# Patient Record
Sex: FEMALE | Race: WHITE | Employment: UNEMPLOYED | URBAN - METROPOLITAN AREA
[De-identification: names, ages, dates, MRNs, and addresses within clinical notes are randomized per-mention and may not be internally consistent; named-entity substitution may affect disease eponyms.]

---

## 2015-04-16 LAB — MAMMOGRAPHY, EXTERNAL: NORMAL

## 2019-07-06 ENCOUNTER — HOSPITAL ENCOUNTER (EMERGENCY)
Age: 49
Discharge: HOME | End: 2019-07-06
Attending: EMERGENCY MEDICINE
Payer: MEDICAID

## 2019-07-06 ENCOUNTER — APPOINTMENT (OUTPATIENT)
Dept: GENERAL RADIOLOGY | Age: 49
End: 2019-07-06
Payer: MEDICAID

## 2019-07-06 VITALS
SYSTOLIC BLOOD PRESSURE: 119 MMHG | TEMPERATURE: 97.4 F | BODY MASS INDEX: 29.07 KG/M2 | OXYGEN SATURATION: 93 % | WEIGHT: 180.12 LBS | RESPIRATION RATE: 18 BRPM | HEART RATE: 94 BPM | DIASTOLIC BLOOD PRESSURE: 84 MMHG

## 2019-07-06 DIAGNOSIS — S62.639A CLOSED FRACTURE OF TUFT OF DISTAL PHALANX OF FINGER: Primary | ICD-10-CM

## 2019-07-06 PROCEDURE — 73130 X-RAY EXAM OF HAND: CPT

## 2019-07-06 PROCEDURE — 4500000023 HC ED LEVEL 3 PROCEDURE

## 2019-07-06 PROCEDURE — 99283 EMERGENCY DEPT VISIT LOW MDM: CPT

## 2019-07-06 PROCEDURE — 6370000000 HC RX 637 (ALT 250 FOR IP): Performed by: EMERGENCY MEDICINE

## 2019-07-06 RX ORDER — HYDROCODONE BITARTRATE AND ACETAMINOPHEN 5; 325 MG/1; MG/1
1 TABLET ORAL ONCE
Status: COMPLETED | OUTPATIENT
Start: 2019-07-06 | End: 2019-07-06

## 2019-07-06 RX ORDER — SULFAMETHOXAZOLE AND TRIMETHOPRIM 800; 160 MG/1; MG/1
1 TABLET ORAL 2 TIMES DAILY
Qty: 14 TABLET | Refills: 0 | Status: SHIPPED | OUTPATIENT
Start: 2019-07-06 | End: 2019-07-13

## 2019-07-06 RX ORDER — HYDROCODONE BITARTRATE AND ACETAMINOPHEN 5; 325 MG/1; MG/1
1 TABLET ORAL EVERY 6 HOURS PRN
Qty: 10 TABLET | Refills: 0 | Status: SHIPPED | OUTPATIENT
Start: 2019-07-06 | End: 2019-07-13

## 2019-07-06 RX ADMIN — HYDROCODONE BITARTRATE AND ACETAMINOPHEN 1 TABLET: 5; 325 TABLET ORAL at 15:44

## 2019-07-06 ASSESSMENT — PAIN SCALES - GENERAL
PAINLEVEL_OUTOF10: 8

## 2019-07-06 ASSESSMENT — ENCOUNTER SYMPTOMS
APNEA: 0
SHORTNESS OF BREATH: 0
CHOKING: 0
STRIDOR: 0
EYE ITCHING: 0
EYE PAIN: 0
CHEST TIGHTNESS: 0
PHOTOPHOBIA: 0
WHEEZING: 0
COUGH: 0
ABDOMINAL DISTENTION: 0
EYE DISCHARGE: 0
EYE REDNESS: 0

## 2019-07-06 ASSESSMENT — PAIN DESCRIPTION - FREQUENCY: FREQUENCY: CONTINUOUS

## 2019-07-06 ASSESSMENT — PAIN DESCRIPTION - DESCRIPTORS: DESCRIPTORS: THROBBING

## 2019-07-06 ASSESSMENT — PAIN DESCRIPTION - PROGRESSION: CLINICAL_PROGRESSION: GRADUALLY WORSENING

## 2019-07-06 ASSESSMENT — PAIN DESCRIPTION - PAIN TYPE: TYPE: ACUTE PAIN

## 2019-07-06 ASSESSMENT — PAIN DESCRIPTION - LOCATION: LOCATION: FINGER (COMMENT WHICH ONE)

## 2019-07-06 ASSESSMENT — PAIN - FUNCTIONAL ASSESSMENT: PAIN_FUNCTIONAL_ASSESSMENT: PREVENTS OR INTERFERES SOME ACTIVE ACTIVITIES AND ADLS

## 2019-07-06 ASSESSMENT — PAIN DESCRIPTION - ONSET: ONSET: SUDDEN

## 2019-07-06 ASSESSMENT — PAIN DESCRIPTION - ORIENTATION: ORIENTATION: LEFT

## 2019-07-06 NOTE — ED PROVIDER NOTES
splint. I discussed with patient the results of evaluation in the ED, diagnosis, care, and prognosis. The plan is to discharge to home. Patient is in agreement with plan and questions have been answered.      I also discussed with patient the reasons which may require a return visit and the importance of follow-up care. The patient is well-appearing, nontoxic, and improved at the time of discharge. Patient agrees to call to arrange follow-up care as directed. Patient understands to return immediately for worsening/change in symptoms. CRITICAL CARE TIME   Total Critical Caretime was 18 minutes, excluding separately reportable procedures. There was a high probability of clinically significant/life threatening deterioration in the patient's condition which required my urgent intervention. PROCEDURES:  Ortho Injury  Date/Time: 7/6/2019 3:39 PM  Performed by: Elaine Briseno MD  Authorized by: Elaine Briseno MD   Consent: Verbal consent obtained. Risks and benefits: risks, benefits and alternatives were discussed  Consent given by: patient  Patient identity confirmed: verbally with patient  Injury location: finger  Location details: left little finger  Injury type: fracture  Pre-procedure neurovascular assessment: neurovascularly intact  Pre-procedure distal perfusion: normal  Pre-procedure neurological function: normal  Pre-procedure range of motion: normal  Manipulation performed: yes  Immobilization: splint  Post-procedure neurovascular assessment: post-procedure neurovascularly intact  Post-procedure distal perfusion: normal  Post-procedure neurological function: normal  Post-procedure range of motion: normal  Patient tolerance: Patient tolerated the procedure well with no immediate complications          FINAL IMPRESSION      1.  Closed fracture of tuft of distal phalanx of finger          DISPOSITION/PLAN   DISPOSITION Decision To Discharge 07/06/2019 03:30:55 PM    PATIENT REFERRED TO:  Rosamaria James

## 2022-12-07 ENCOUNTER — HOSPITAL ENCOUNTER (EMERGENCY)
Age: 52
Discharge: HOME | End: 2022-12-08
Attending: EMERGENCY MEDICINE
Payer: MEDICAID

## 2022-12-07 ENCOUNTER — APPOINTMENT (OUTPATIENT)
Dept: GENERAL RADIOLOGY | Age: 52
End: 2022-12-07
Payer: MEDICAID

## 2022-12-07 DIAGNOSIS — J44.1 COPD EXACERBATION (HCC): Primary | ICD-10-CM

## 2022-12-07 LAB
RAPID INFLUENZA  B AGN: NEGATIVE
RAPID INFLUENZA A AGN: NEGATIVE

## 2022-12-07 PROCEDURE — 99284 EMERGENCY DEPT VISIT MOD MDM: CPT

## 2022-12-07 PROCEDURE — 6370000000 HC RX 637 (ALT 250 FOR IP): Performed by: EMERGENCY MEDICINE

## 2022-12-07 PROCEDURE — 6360000002 HC RX W HCPCS: Performed by: EMERGENCY MEDICINE

## 2022-12-07 PROCEDURE — 71046 X-RAY EXAM CHEST 2 VIEWS: CPT

## 2022-12-07 PROCEDURE — 87804 INFLUENZA ASSAY W/OPTIC: CPT

## 2022-12-07 PROCEDURE — 87635 SARS-COV-2 COVID-19 AMP PRB: CPT

## 2022-12-07 RX ORDER — IPRATROPIUM BROMIDE AND ALBUTEROL SULFATE 2.5; .5 MG/3ML; MG/3ML
1 SOLUTION RESPIRATORY (INHALATION) ONCE
Status: COMPLETED | OUTPATIENT
Start: 2022-12-07 | End: 2022-12-07

## 2022-12-07 RX ORDER — DEXAMETHASONE 4 MG/1
8 TABLET ORAL ONCE
Status: COMPLETED | OUTPATIENT
Start: 2022-12-07 | End: 2022-12-07

## 2022-12-07 RX ORDER — ALBUTEROL SULFATE 1.25 MG/3ML
1 SOLUTION RESPIRATORY (INHALATION) EVERY 6 HOURS PRN
COMMUNITY
End: 2022-12-08 | Stop reason: SDUPTHER

## 2022-12-07 RX ADMIN — ALBUTEROL SULFATE 2.5 MG: 2.5 SOLUTION RESPIRATORY (INHALATION) at 23:49

## 2022-12-07 RX ADMIN — IPRATROPIUM BROMIDE AND ALBUTEROL SULFATE 1 AMPULE: .5; 3 SOLUTION RESPIRATORY (INHALATION) at 23:49

## 2022-12-07 RX ADMIN — DEXAMETHASONE 8 MG: 4 TABLET ORAL at 23:48

## 2022-12-07 ASSESSMENT — LIFESTYLE VARIABLES
HOW MANY STANDARD DRINKS CONTAINING ALCOHOL DO YOU HAVE ON A TYPICAL DAY: PATIENT DOES NOT DRINK
HOW OFTEN DO YOU HAVE A DRINK CONTAINING ALCOHOL: NEVER

## 2022-12-07 ASSESSMENT — PAIN - FUNCTIONAL ASSESSMENT: PAIN_FUNCTIONAL_ASSESSMENT: NONE - DENIES PAIN

## 2022-12-08 VITALS
SYSTOLIC BLOOD PRESSURE: 132 MMHG | OXYGEN SATURATION: 96 % | DIASTOLIC BLOOD PRESSURE: 88 MMHG | HEART RATE: 76 BPM | RESPIRATION RATE: 18 BRPM | WEIGHT: 170.64 LBS | TEMPERATURE: 98 F | BODY MASS INDEX: 27.42 KG/M2 | HEIGHT: 66 IN

## 2022-12-08 LAB — SARS-COV-2, NAAT: NOT DETECTED

## 2022-12-08 RX ORDER — METHYLPREDNISOLONE 4 MG/1
TABLET ORAL
Qty: 1 KIT | Refills: 0 | Status: SHIPPED | OUTPATIENT
Start: 2022-12-08 | End: 2022-12-14

## 2022-12-08 RX ORDER — ALBUTEROL SULFATE 1.25 MG/3ML
1 SOLUTION RESPIRATORY (INHALATION) EVERY 6 HOURS PRN
Qty: 360 ML | Refills: 0 | Status: SHIPPED | OUTPATIENT
Start: 2022-12-08

## 2022-12-08 ASSESSMENT — PAIN - FUNCTIONAL ASSESSMENT: PAIN_FUNCTIONAL_ASSESSMENT: NONE - DENIES PAIN

## 2022-12-08 NOTE — ED NOTES
Dr. Erin Oconnor in room to discuss radiology results and discharge plan of care with patient.       Corbin Mcnally RN  12/08/22 9939

## 2022-12-08 NOTE — ED NOTES
Discharge instructions reviewed with patient and verbalized understanding, denies further questions and successful teach back occurred. Offered wheelchair for discharge and declined. Discharged ambulatory with steady gait to ED lobby. Written discharge instructions provided to patient. Prescriptions x2 sent electronically to patient's pharmacy.       Benjamin Ibarra RN  12/08/22 1605

## 2022-12-08 NOTE — ED PROVIDER NOTES
[facility]  eMERGENCY dEPARTMENT eNCOUnter      Pt Name: Lloyd Topete  MRN: [id number]  [place] 1970  Date of evaluation: 12/7/2022  Provider: Nabor Ayala MD    CHIEF COMPLAINT       Chief Complaint   Patient presents with    Cough    Fever     History of COPD, states she has been sick for a week with cough, congestion and fever. Fever ended on Sunday, has been using nebulizer and sats have been 90-92%. CRITICAL CARE TIME   Total Critical Care time was 0 minutes, excluding separately reportable procedures. There was a high probability of clinically significant/life threatening deterioration in the patient's condition which required my urgent intervention. HISTORY OF PRESENT ILLNESS  (Location/Symptom, Timing/Onset, Context/Setting, Quality, Duration, Modifying Factors, Severity.)   Lloyd Topete is a 47 y.o. female who presents to the emergency department complaining of persistent cough and difficulty breathing. Patient has COPD. She continues to smoke. She states she been sick for a week. She states she had fever and cough. She had some runny nose and postnasal drip. She states the fever resolved on Monday. She states she still having some difficulty breathing. She has a nebulizer. She has been taking 1 or 2 breathing treatments today without relief. She has not had the flu or COVID-vaccine. Nursing Notes were reviewed and I agree. REVIEW OF SYSTEMS    (2-9 systems for level 4, 10 or more for level 5)     General: Fever that resolved on Monday. ENT: Rhinorrhea and. Postnasal drip. Respiratory: Nonproductive cough. Shortness of breath, wheezing. Cardiovascular: No chest pain. GI: No abdominal pain, vomiting, or diarrhea. Neuro: No headache or dizziness. Except as noted above the remainder of the review of systems was reviewed and negative.        PAST MEDICAL HISTORY     Past Medical History:   Diagnosis Date    Back pain     CAD (coronary artery disease)     COPD (chronic obstructive pulmonary disease) (HCC)     GERD (gastroesophageal reflux disease)     Hypertension     stress related    Neck pain     PTSD (post-traumatic stress disorder)          SURGICAL HISTORY       Past Surgical History:   Procedure Laterality Date    CHOLECYSTECTOMY      HEMORRHOID SURGERY  05/2015    HYSTERECTOMY (CERVIX STATUS UNKNOWN)      TUBAL LIGATION           CURRENT MEDICATIONS       Previous Medications    ASPIRIN 81 MG TABLET    Take 81 mg by mouth daily    ATORVASTATIN (LIPITOR) 20 MG TABLET    Begin after Lipid panel obtained. BUTALBITAL-APAP-CAFFEINE (FIORICET PO)    Take by mouth    CLONAZEPAM (KLONOPIN) 1 MG TABLET    Take 1 mg by mouth 2 times daily. GABAPENTIN (NEURONTIN) 300 MG CAPSULE    Take 300 mg by mouth nightly. IBUPROFEN (ADVIL;MOTRIN) 800 MG TABLET    Take 800 mg by mouth every 8 hours as needed for Pain. NAPROXEN PO    Take by mouth    ONDANSETRON (ZOFRAN ODT) 4 MG DISINTEGRATING TABLET    Take 1-2 tablets by mouth every 12 hours as needed for Nausea    OXYCODONE-ACETAMINOPHEN (PERCOCET) 7.5-325 MG PER TABLET    Take 1 tablet by mouth every 8 hours as needed for Pain    PANTOPRAZOLE (PROTONIX) 40 MG TABLET    Take 40 mg by mouth as needed    QUETIAPINE (SEROQUEL) 100 MG TABLET    Take 100 mg by mouth nightly. SERTRALINE (ZOLOFT) 100 MG TABLET    Take 150 mg by mouth daily.     SPIRONOLACTONE PO    Take by mouth    TIZANIDINE HCL (ZANAFLEX PO)    Take by mouth       ALLERGIES     Buspirone, Atenolol, Flexeril [cyclobenzaprine], Imdur [isosorbide dinitrate], Lisinopril, Paroxetine, Prednisone, Propranolol, Wellbutrin [bupropion], and Abilify [aripiprazole]    FAMILY HISTORY       Family History   Problem Relation Age of Onset    Heart Disease Mother     High Blood Pressure Mother     Heart Disease Father     High Blood Pressure Father     Diabetes Father           SOCIAL HISTORY       Social History     Socioeconomic History Marital status: Single     Spouse name: None    Number of children: 5    Years of education: 15    Highest education level: None   Tobacco Use    Smoking status: Every Day     Packs/day: 2.00     Years: 28.00     Pack years: 56.00     Types: Cigarettes    Smokeless tobacco: Never   Substance and Sexual Activity    Alcohol use: No    Drug use: No    Sexual activity: Never         PHYSICAL EXAM    (up to 7 for level 4, 8 or more for level 5)     ED Triage Vitals [12/07/22 2328]   BP Temp Temp Source Heart Rate Resp SpO2 Height Weight   134/89 97.7 °F (36.5 °C) Tympanic 75 20 95 % 5' 6\" (1.676 m) 170 lb 10.2 oz (77.4 kg)       General: Alert white female no acute distress. Head: Atraumatic and normocephalic. Eyes: No conjunctival injection. No discharge. Pupils equal round reactive. ENT: TMs normal.  Nose clear. Oropharynx is moist without erythema. Neck: Supple, nontender, no adenopathy. Heart: Regular rate and rhythm. No murmurs or gallops noted. Lungs: Breath sounds equal bilaterally with mild expiratory wheezing. No rales or rhonchi. Abdomen: Soft, nondistended, nontender. Musculoskeletal: No extremity edema. No calf tenderness. Skin: Warm and dry, good turgor. No pallor or cyanosis. No diaphoresis. DIFFERENTIAL DIAGNOSIS   Differential includes but is not limited to COPD exacerbation, COVID-19, influenza, pneumonia, pneumothorax, viral bronchitis, other. DIAGNOSTIC RESULTS     EKG: All EKG's are interpreted by Slade Haines MD in the absence of a cardiologist.      RADIOLOGY:   Non-plain film images such as CT, Ultrasound and MRI are read by the radiologist. Plain radiographic images are visualized and preliminarily interpreted Slade Haines MD with the below findings:      Interpretation per the Radiologist below, if available at the time of this note:    XR CHEST (2 VW)   Final Result   Radiographically clear lungs.                ED BEDSIDE ULTRASOUND:   Performed by ED Physician - none    LABS:  Labs Reviewed   COVID-19, RAPID   RAPID INFLUENZA A/B ANTIGENS       All other labs were within normal range or not returned as of this dictation. EMERGENCY DEPARTMENT COURSE and DIFFERENTIAL DIAGNOSIS/MDM:   Vitals:    Vitals:    12/07/22 2328   BP: 134/89   Pulse: 75   Resp: 20   Temp: 97.7 °F (36.5 °C)   TempSrc: Tympanic   SpO2: 95%   Weight: 170 lb 10.2 oz (77.4 kg)   Height: 5' 6\" (1.676 m)       This patient presents with symptoms as above. She had fever. She had cough. She is short of breath. Her fevers resolved. She has a history of COPD. She continues to smoke. She states she is still continuing to have trouble breathing and coughing. Her cough is nonproductive. She is afebrile here. She is not tachycardic. She is nonhypoxic. Her chest x-ray is clear. There is no evidence of pneumonia, pneumothorax, congestive heart failure. There is no pleural effusion. Her COVID screen is negative. Her influenza screen is negative. She was given hand-held nebulizers x2. She states prednisone makes her hyper. She has had Decadron before. I gave her a dose of Decadron p.o. here. She tolerated it fine. She was moving air better. I am going to discharge her on a Medrol Dosepak. She is going to try this to see if she tolerates it better than the prednisone. She is going to use her nebulizer every 4-6 hours. She will follow-up in 3 days if not improved. She will return for worsening of symptoms or new symptoms of concern. Test results, diagnosis, and treatment plan were discussed the patient. She understands treatment plan follow-up as discussed.         CONSULTS:  None    PROCEDURES:  None    FINAL IMPRESSION      1. COPD exacerbation [facility])          DISPOSITION/PLAN   DISPOSITION Decision To Discharge 12/08/2022 12:14:11 AM      PATIENT REFERRED TO:  AMMY Maguire CNP  [facility], [address] [place] [address],[address]  [address]    Schedule an appointment as soon as possible for a visit in 3 days  if not improved    DISCHARGE MEDICATIONS:  New Prescriptions    METHYLPREDNISOLONE (MEDROL, ALEXA,) 4 MG TABLET    Take by mouth.        (Please note that portions of this note were completed with a voice recognition program.  Efforts were made to edit the dictations but occasionally words are mis-transcribed.)    Sandra Lombardo MD  Attending Emergency Physician       Zheng Llamas MD  12/08/22 1660

## 2022-12-08 NOTE — DISCHARGE INSTRUCTIONS
Use your albuterol nebulizer every 4-6 hours for the next 2 days. Take methylprednisolone as prescribed until completed. You received a dose in the emergency department. You can start your prescription tomorrow when you pick it up. Return as needed for worsening of symptoms or new symptoms of concern.

## 2022-12-08 NOTE — ED TRIAGE NOTES
Patient ambulatory to Room 4 with c/o shortness of breath, cough, congestion and fever for a week. She reports the fever ended on Sunday, but other symptoms persist. States she had a covid positive patient right before she first became ill, but home covid test was negative. Reports history of COPD and has been using her home nebulizer. States her O2 sats have been 90-92% since she became ill. Patient also reports being out of most of her medications at home and has not scheduled a follow up with her PCP. She is awake, alert, oriented, resps easy & regular, speaks in complete sentences. Frequent cough noted. Patient is 2 P[employer] cigarette smoker. Skin w/d, MMM & pink, cap refill brisk.

## 2023-07-17 ENCOUNTER — NURSE TRIAGE (OUTPATIENT)
Dept: OTHER | Facility: CLINIC | Age: 53
End: 2023-07-17

## 2023-07-17 NOTE — TELEPHONE ENCOUNTER
Location of patient: [address] call from [facility]; Patient with [employer] Complaint requesting to establish care with [facility]. Subjective: Caller states \"episodes of chest tightness, lightheaded, weakness, small blockage, nerve pain on right eyebrow\"     Current Symptoms: see above, she says she is stressed. When she has a really stressful day, she can have chest tightness, stomach burning, aspirin usually works. If she does not take aspirin, she sweats and feels like she has to use bathroom. 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 She said she had a 30 percent blockage 10 years ago. Pain management was seen for the eyebrow pain and told she needed to see a PCP. Has been seen for the chest issue before. Has anxiety and panic attacks    Onset: 1 week ago for the eye pain;more frequent, chest issue has been ongoing for years    Associated Symptoms: NA    Pain Severity: 8/10 for the head pain when it happens, chest tightness is 10/10 when it happens; pressure, shock, stabbing for the headache;for the chest, squeezing tightness, stomach burning; intermittent, waxing and waning. Last episode of the chest tightness was a week ago, this happens once a week. Temperature: n/a     What has been tried: aspirin    LMP: NA Pregnant: NA    Recommended disposition: See in Office Today advised ED/UCC if unable to obtain an appointment within time frame    Care advice provided, patient verbalizes understanding; denies any other questions or concerns; instructed to call back for any new or worsening symptoms. Patient/Caller agrees with recommended disposition; writer provided warm transfer to [facility] [place] for appointment scheduling    Attention Provider: Thank you for allowing me to participate in the care of your patient. The patient was connected to triage in response to information provided to the [facility].   Please do not respond through this encounter as the response is not

## 2023-07-24 ENCOUNTER — OFFICE VISIT (OUTPATIENT)
Dept: INTERNAL MEDICINE CLINIC | Age: 53
End: 2023-07-24
Payer: MEDICAID

## 2023-07-24 VITALS
SYSTOLIC BLOOD PRESSURE: 118 MMHG | BODY MASS INDEX: 27.73 KG/M2 | WEIGHT: 171.8 LBS | DIASTOLIC BLOOD PRESSURE: 76 MMHG | TEMPERATURE: 98.7 F | OXYGEN SATURATION: 93 % | HEART RATE: 89 BPM

## 2023-07-24 DIAGNOSIS — I10 ESSENTIAL HYPERTENSION: ICD-10-CM

## 2023-07-24 DIAGNOSIS — E55.9 VITAMIN D DEFICIENCY: ICD-10-CM

## 2023-07-24 DIAGNOSIS — Z11.4 SCREENING FOR HIV WITHOUT PRESENCE OF RISK FACTORS: ICD-10-CM

## 2023-07-24 DIAGNOSIS — E87.6 HYPOKALEMIA: ICD-10-CM

## 2023-07-24 DIAGNOSIS — E83.42 HYPOMAGNESEMIA: ICD-10-CM

## 2023-07-24 DIAGNOSIS — Z12.31 ENCOUNTER FOR SCREENING MAMMOGRAM FOR MALIGNANT NEOPLASM OF BREAST: ICD-10-CM

## 2023-07-24 DIAGNOSIS — R73.09 INCREASED BLOOD GLUCOSE: ICD-10-CM

## 2023-07-24 DIAGNOSIS — F41.9 ANXIETY: ICD-10-CM

## 2023-07-24 DIAGNOSIS — Z11.59 NEED FOR HEPATITIS C SCREENING TEST: ICD-10-CM

## 2023-07-24 DIAGNOSIS — I25.119 CORONARY ARTERY DISEASE INVOLVING NATIVE CORONARY ARTERY OF NATIVE HEART WITH ANGINA PECTORIS (HCC): Primary | ICD-10-CM

## 2023-07-24 LAB
25(OH)D3 SERPL-MCNC: 40.5 NG/ML
ALBUMIN SERPL-MCNC: 4.4 G/DL (ref 3.4–5)
ALBUMIN/GLOB SERPL: 1.9 {RATIO} (ref 1.1–2.2)
ALP SERPL-CCNC: 102 U/L (ref 40–129)
ALT SERPL-CCNC: 17 U/L (ref 10–40)
ANION GAP SERPL CALCULATED.3IONS-SCNC: 13 MMOL/L (ref 3–16)
AST SERPL-CCNC: 13 U/L (ref 15–37)
BILIRUB SERPL-MCNC: <0.2 MG/DL (ref 0–1)
BUN SERPL-MCNC: 15 MG/DL (ref 7–20)
CALCIUM SERPL-MCNC: 9.6 MG/DL (ref 8.3–10.6)
CHLORIDE SERPL-SCNC: 103 MMOL/L (ref 99–110)
CHOLEST SERPL-MCNC: 152 MG/DL (ref 0–199)
CO2 SERPL-SCNC: 23 MMOL/L (ref 21–32)
CREAT SERPL-MCNC: 0.7 MG/DL (ref 0.6–1.1)
GFR SERPLBLD CREATININE-BSD FMLA CKD-EPI: >60 ML/MIN/{1.73_M2}
GLUCOSE SERPL-MCNC: 108 MG/DL (ref 70–99)
HCV AB SERPL QL IA: NORMAL
HDLC SERPL-MCNC: 38 MG/DL (ref 40–60)
LDLC SERPL CALC-MCNC: 87 MG/DL
MAGNESIUM SERPL-MCNC: 1.8 MG/DL (ref 1.8–2.4)
POTASSIUM SERPL-SCNC: 4.4 MMOL/L (ref 3.5–5.1)
PROT SERPL-MCNC: 6.7 G/DL (ref 6.4–8.2)
SODIUM SERPL-SCNC: 139 MMOL/L (ref 136–145)
TRIGL SERPL-MCNC: 136 MG/DL (ref 0–150)
TSH SERPL DL<=0.005 MIU/L-ACNC: 0.94 UIU/ML (ref 0.27–4.2)
VLDLC SERPL CALC-MCNC: 27 MG/DL

## 2023-07-24 PROCEDURE — 3074F SYST BP LT 130 MM HG: CPT | Performed by: INTERNAL MEDICINE

## 2023-07-24 PROCEDURE — G8419 CALC BMI OUT NRM PARAM NOF/U: HCPCS | Performed by: INTERNAL MEDICINE

## 2023-07-24 PROCEDURE — G8427 DOCREV CUR MEDS BY ELIG CLIN: HCPCS | Performed by: INTERNAL MEDICINE

## 2023-07-24 PROCEDURE — 3017F COLORECTAL CA SCREEN DOC REV: CPT | Performed by: INTERNAL MEDICINE

## 2023-07-24 PROCEDURE — 99204 OFFICE O/P NEW MOD 45 MIN: CPT | Performed by: INTERNAL MEDICINE

## 2023-07-24 PROCEDURE — 3078F DIAST BP <80 MM HG: CPT | Performed by: INTERNAL MEDICINE

## 2023-07-24 PROCEDURE — 4004F PT TOBACCO SCREEN RCVD TLK: CPT | Performed by: INTERNAL MEDICINE

## 2023-07-24 PROCEDURE — 36415 COLL VENOUS BLD VENIPUNCTURE: CPT | Performed by: INTERNAL MEDICINE

## 2023-07-24 RX ORDER — ATORVASTATIN CALCIUM 40 MG/1
40 TABLET, FILM COATED ORAL DAILY
Qty: 90 TABLET | Refills: 1 | Status: SHIPPED | OUTPATIENT
Start: 2023-07-24

## 2023-07-24 RX ORDER — ASPIRIN 81 MG/1
81 TABLET ORAL DAILY
Qty: 90 TABLET | Refills: 1 | Status: SHIPPED | OUTPATIENT
Start: 2023-07-24

## 2023-07-24 SDOH — ECONOMIC STABILITY: FOOD INSECURITY: WITHIN THE PAST 12 MONTHS, YOU WORRIED THAT YOUR FOOD WOULD RUN OUT BEFORE YOU GOT MONEY TO BUY MORE.: NEVER TRUE

## 2023-07-24 SDOH — ECONOMIC STABILITY: HOUSING INSECURITY
IN THE LAST 12 MONTHS, WAS THERE A TIME WHEN YOU DID NOT HAVE A STEADY PLACE TO SLEEP OR SLEPT IN A SHELTER (INCLUDING NOW)?: NO

## 2023-07-24 SDOH — ECONOMIC STABILITY: FOOD INSECURITY: WITHIN THE PAST 12 MONTHS, THE FOOD YOU BOUGHT JUST DIDN'T LAST AND YOU DIDN'T HAVE MONEY TO GET MORE.: NEVER TRUE

## 2023-07-24 SDOH — ECONOMIC STABILITY: INCOME INSECURITY: HOW HARD IS IT FOR YOU TO PAY FOR THE VERY BASICS LIKE FOOD, HOUSING, MEDICAL CARE, AND HEATING?: NOT HARD AT ALL

## 2023-07-24 ASSESSMENT — PATIENT HEALTH QUESTIONNAIRE - PHQ9
1. LITTLE INTEREST OR PLEASURE IN DOING THINGS: 0
SUM OF ALL RESPONSES TO PHQ QUESTIONS 1-9: 0
2. FEELING DOWN, DEPRESSED OR HOPELESS: 0
SUM OF ALL RESPONSES TO PHQ9 QUESTIONS 1 & 2: 0
SUM OF ALL RESPONSES TO PHQ QUESTIONS 1-9: 0

## 2023-07-24 ASSESSMENT — ENCOUNTER SYMPTOMS
SHORTNESS OF BREATH: 0
COUGH: 0
VOMITING: 0
NAUSEA: 0
SORE THROAT: 0
ABDOMINAL PAIN: 0
CHEST TIGHTNESS: 1
BLOOD IN STOOL: 0

## 2023-07-24 NOTE — PROGRESS NOTES
Maame Nelson (:  1970) is a 47 y.o. female, New patient, here for evaluation of the following chief complaint(s):  Establish Care, Chest Pain, and Eye Pain (Right eye)         ASSESSMENT/PLAN:  1. Coronary artery disease involving native coronary artery of native heart with angina pectoris (HCC)  Chronic, now uncontrolled. Patient states that she gets chest pain whenever she is stressed. Patient is taking atorvastatin 20 mg daily and aspirin 81 mg daily. Patient is not willing to start metoprolol since she had issues with blood pressure medications in the past.  Patient is not having chest pain currently. Increasing the dose of atorvastatin to 40 mg daily and continue current dose of aspirin  Advised patient to follow-up with cardiology as soon as possible for further evaluation treatment, referral placed. -     Negrita Murray MD, Cardiology, [facility]  -     aspirin 81 MG EC tablet; Take 1 tablet by mouth daily, Disp-90 tablet, R-1Normal  -     atorvastatin (LIPITOR) 40 MG tablet; Take 1 tablet by mouth daily, Disp-90 tablet, R-1Normal  -     Lipid Panel  2. Essential hypertension  - Stable   - Continue current dose of spironolactone. Patient is not willing to start metoprolol since she had issues with blood pressure medications in the past.  She would like to stay only on Aldactone for blood pressure control.  -     Comprehensive Metabolic Panel  -     TSH with Reflex  3. Hypokalemia  Chronic problem. Continue current dose of Aldactone. Check potassium level today. 4. Hypomagnesemia  Chronic problem. Check magnesium level today. Will treat based on results. 5. Increased blood glucose  -     Hemoglobin A1C  6. Screening for HIV without presence of risk factors  -     HIV Screen  7. Need for hepatitis C screening test  -     Hepatitis C Antibody  8. Anxiety  Chronic, uncontrolled.   Patient has tried multiple medications in the past including sertraline, benzodiazepines with no

## 2023-07-25 LAB
EST. AVERAGE GLUCOSE BLD GHB EST-MCNC: 108.3 MG/DL
HBA1C MFR BLD: 5.4 %
HIV 1+2 AB+HIV1 P24 AG SERPL QL IA: NORMAL
HIV 2 AB SERPL QL IA: NORMAL
HIV1 AB SERPL QL IA: NORMAL
HIV1 P24 AG SERPL QL IA: NORMAL

## 2023-09-05 ENCOUNTER — COMMUNITY OUTREACH (OUTPATIENT)
Dept: INTERNAL MEDICINE CLINIC | Age: 53
End: 2023-09-05

## 2023-09-05 NOTE — PROGRESS NOTES
Patient's HM shows they are overdue for Mammogram  CareEverywhere and  files searched without success.

## 2023-09-14 ENCOUNTER — OFFICE VISIT (OUTPATIENT)
Dept: PSYCHIATRY | Age: 53
End: 2023-09-14

## 2023-09-14 DIAGNOSIS — G47.01 INSOMNIA DUE TO MEDICAL CONDITION: Primary | ICD-10-CM

## 2023-09-14 DIAGNOSIS — F33.0 MILD EPISODE OF RECURRENT MAJOR DEPRESSIVE DISORDER (HCC): ICD-10-CM

## 2023-09-14 DIAGNOSIS — F41.1 GAD (GENERALIZED ANXIETY DISORDER): ICD-10-CM

## 2023-09-14 PROCEDURE — 99205 OFFICE O/P NEW HI 60 MIN: CPT | Performed by: REGISTERED NURSE

## 2023-09-14 RX ORDER — QUETIAPINE FUMARATE 25 MG/1
25 TABLET, FILM COATED ORAL DAILY
Qty: 30 TABLET | Refills: 2 | Status: SHIPPED | OUTPATIENT
Start: 2023-09-14 | End: 2023-10-14

## 2023-09-14 RX ORDER — CLONAZEPAM 0.5 MG/1
0.5 TABLET ORAL DAILY PRN
Qty: 30 TABLET | Refills: 0 | Status: SHIPPED | OUTPATIENT
Start: 2023-09-14 | End: 2023-10-14

## 2023-09-14 ASSESSMENT — PATIENT HEALTH QUESTIONNAIRE - PHQ9
8. MOVING OR SPEAKING SO SLOWLY THAT OTHER PEOPLE COULD HAVE NOTICED. OR THE OPPOSITE, BEING SO FIGETY OR RESTLESS THAT YOU HAVE BEEN MOVING AROUND A LOT MORE THAN USUAL: 0
6. FEELING BAD ABOUT YOURSELF - OR THAT YOU ARE A FAILURE OR HAVE LET YOURSELF OR YOUR FAMILY DOWN: 0
SUM OF ALL RESPONSES TO PHQ QUESTIONS 1-9: 9
3. TROUBLE FALLING OR STAYING ASLEEP: 3
SUM OF ALL RESPONSES TO PHQ9 QUESTIONS 1 & 2: 1
SUM OF ALL RESPONSES TO PHQ QUESTIONS 1-9: 9
2. FEELING DOWN, DEPRESSED OR HOPELESS: 0
5. POOR APPETITE OR OVEREATING: 1
9. THOUGHTS THAT YOU WOULD BE BETTER OFF DEAD, OR OF HURTING YOURSELF: 0
4. FEELING TIRED OR HAVING LITTLE ENERGY: 3
SUM OF ALL RESPONSES TO PHQ QUESTIONS 1-9: 9
SUM OF ALL RESPONSES TO PHQ QUESTIONS 1-9: 9
1. LITTLE INTEREST OR PLEASURE IN DOING THINGS: 1
7. TROUBLE CONCENTRATING ON THINGS, SUCH AS READING THE NEWSPAPER OR WATCHING TELEVISION: 1

## 2023-09-14 ASSESSMENT — ANXIETY QUESTIONNAIRES
1. FEELING NERVOUS, ANXIOUS, OR ON EDGE: 3
3. WORRYING TOO MUCH ABOUT DIFFERENT THINGS: 3
2. NOT BEING ABLE TO STOP OR CONTROL WORRYING: 3
6. BECOMING EASILY ANNOYED OR IRRITABLE: 3
7. FEELING AFRAID AS IF SOMETHING AWFUL MIGHT HAPPEN: 0
GAD7 TOTAL SCORE: 18
4. TROUBLE RELAXING: 3
5. BEING SO RESTLESS THAT IT IS HARD TO SIT STILL: 3

## 2023-09-14 NOTE — PROGRESS NOTES
There is no acute intracranial hemorrhage, mass effect or  midline shift. No abnormal extra-axial fluid collection. The gray-white  differentiation is maintained without evidence of an acute infarct. There is  no evidence of hydrocephalus. ORBITS: The visualized portion of the orbits demonstrate no acute abnormality. SINUSES: The visualized paranasal sinuses and mastoid air cells demonstrate  no acute abnormality. SOFT TISSUES/SKULL:  No acute abnormality of the visualized skull or soft  tissues. Impression  No acute intracranial abnormality. MRI Brain: No results found for this or any previous visit. Mental Status Examination  Appearance    alert, cooperative  Motor: Normal strength and tone, No abnormal movements, tics or mannerisms. Speech    spontaneous, normal rate, normal volume, and well articulated  Mood/Affect    Anxious  Irritability / anxiety  Thought Process    linear, goal directed, and coherent  Thought Content    intact , no suicidal ideation  Associations    logical connections  Attention/Concentration    impaired  Memory    recent and remote memory intact  Insight/Judgement    Fair / Intact    Safety plan  Discussed and educated patient to call [phone], or 911, or go to nearest emergency room if patient experiences SI/HI immediately. In addition, Patient educated to use the National Suicide Hotlines: 3-047-295-TALK (6-394.881.5967) and 1-564-EHFAXEP (4-250.828.3641) and Local psychiatric Emergency Services given to patient during the office visit. Total time spent on this encounter: 64 min    Thank you for consult. Please do not hesitate to contact provider if there are additional questions regarding patient.     Anupama Poole DNP, PMHNP-BC, CNP  9/14/2023

## 2023-09-14 NOTE — PATIENT INSTRUCTIONS
Here are some of Psychiatric Emergency resources for you:     National suicide hotlines: 97 245722, 7-189-337-TALK (5-847.309.1786) and 9-931-KWOABJO (0-922.598.9154). 2.  Call 911 or go to any nearest emergency room   3. Access the [facility] Emergency Psychiatry Services:     - Go to the [facility] Psychiatric Emergency Services (PES) at [address] [facility], [address]   - Call the [address] at [phone].    - Call the [facility] Mobile Crisis Team at [phone]     Anti-depressant drugs: This class of drugs can cause sedation, blurred vision, dry-mouth, constipation, postural hypotension, urinary retention, tachycardia, muscle tremors, agitation, headache, skin rash, photo sensitivity, excessive weight gain, glaucoma, heart disease, lowering seizure threshold, increase risk of suicidal thinking or ideations, excessive sweating, sextual dysfunction, insomnia, anxiety, bruxism, GI bleeding, pregnancy complications and birth defects, possible death, etc.      Anti-anxiety drugs: Sedation, morning hangover, ataxia, nausea, respiratory depression, decrease cognitive function, light-headiness, withdrawal effects, anterograde amnesia, possible death, etc.              Insomnia : General Principles     You should sleep in only one location which is your bedroom. Do not watch TV, use computer or read in your bedroom. You want to associate your bedroom with sleep only.  You should do these things in a different room     Your sleep environment should be dark with no light exposure     When you wake in the morning you should exposure yourself to bright light     Get into bed at the same time every night     Get up out of bed no later than 8 AM     No naps during daytime     Do not watch the clock     No caffeine after 2 PM      Try to exercise in the late afternoon or early evening as many days a week as possible

## 2023-09-20 ENCOUNTER — OFFICE VISIT (OUTPATIENT)
Dept: PSYCHOLOGY | Age: 53
End: 2023-09-20

## 2023-09-20 DIAGNOSIS — F41.1 GAD (GENERALIZED ANXIETY DISORDER): ICD-10-CM

## 2023-09-20 DIAGNOSIS — Z86.59 HISTORY OF POSTTRAUMATIC STRESS DISORDER (PTSD): ICD-10-CM

## 2023-09-20 DIAGNOSIS — F33.1 MAJOR DEPRESSIVE DISORDER, RECURRENT EPISODE, MODERATE (HCC): Primary | ICD-10-CM

## 2023-09-20 NOTE — PROGRESS NOTES
Behavioral Health Consultation  Christy Crump, Ph.D.  Psychologist  9/20/2023  11:30 AM EDT      Time spent with Patient: 30 minutes  [facility]. Reason for Consult: 30  Referring Provider: Caleb Bloom, [id number] [address]    Feedback for PCP:     Pt provided informed consent for the behavioral health program. Discussed with patient model of service to include the limits of confidentiality (i.e. abuse reporting, suicide intervention, etc.) and short-term intervention focused approach. Pt indicated understanding. Feedback given to PCP. S:  The patient reports that since her visit with her psych NP and the scripts she was given, she is experiencing less anxiety. She doesn't report constant worry, tenseness and irritability, or disrupted sleep. During this visit she did not seem to be experiencing detectable anxiety symptoms and talked about what she has been able to do that she previously might not have been able to due to anxiety. She says that she is being more assertive and not letting small upsets capture her. She says that she is less irritable and \"I'm coming out of my room more. \" She says. \"Taking my chill pill helps\" by which she means her clonazepam. She said that she is sleeping better with the quetiapine and that she is somewhat groggy in the morning but she isn't complaining about it. She describes a rather chaotic home/personal life with assorted family member living together in a 2 bedroom house. She described her relationship with her 1year-old granddaughter that is very sofia to her mostly because her house has been the only house she has known. She said that she has 5 children between the ages of 16 and 29. 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 She says that she is so strong that she can carry \"a 170 pound man using my biceps. \" She said at the [employer] she shares a room with the 3 other

## 2023-10-16 ENCOUNTER — TELEMEDICINE (OUTPATIENT)
Dept: PSYCHIATRY | Age: 53
End: 2023-10-16
Payer: MEDICAID

## 2023-10-16 DIAGNOSIS — F33.0 MILD EPISODE OF RECURRENT MAJOR DEPRESSIVE DISORDER (HCC): Primary | ICD-10-CM

## 2023-10-16 DIAGNOSIS — G47.01 INSOMNIA DUE TO MEDICAL CONDITION: ICD-10-CM

## 2023-10-16 DIAGNOSIS — F41.1 GAD (GENERALIZED ANXIETY DISORDER): ICD-10-CM

## 2023-10-16 PROCEDURE — 3017F COLORECTAL CA SCREEN DOC REV: CPT | Performed by: REGISTERED NURSE

## 2023-10-16 PROCEDURE — G8428 CUR MEDS NOT DOCUMENT: HCPCS | Performed by: REGISTERED NURSE

## 2023-10-16 PROCEDURE — 4004F PT TOBACCO SCREEN RCVD TLK: CPT | Performed by: REGISTERED NURSE

## 2023-10-16 PROCEDURE — G8419 CALC BMI OUT NRM PARAM NOF/U: HCPCS | Performed by: REGISTERED NURSE

## 2023-10-16 PROCEDURE — 99213 OFFICE O/P EST LOW 20 MIN: CPT | Performed by: REGISTERED NURSE

## 2023-10-16 PROCEDURE — G8484 FLU IMMUNIZE NO ADMIN: HCPCS | Performed by: REGISTERED NURSE

## 2023-10-16 RX ORDER — QUETIAPINE FUMARATE 25 MG/1
25 TABLET, FILM COATED ORAL DAILY
Qty: 90 TABLET | Refills: 0 | Status: SHIPPED | OUTPATIENT
Start: 2023-10-16 | End: 2024-01-14

## 2023-10-16 RX ORDER — CLONAZEPAM 0.5 MG/1
0.5 TABLET ORAL DAILY PRN
Qty: 30 TABLET | Refills: 0 | Status: SHIPPED | OUTPATIENT
Start: 2023-10-16 | End: 2023-11-15

## 2023-10-16 NOTE — PATIENT INSTRUCTIONS
Here are some of Psychiatric Emergency resources for you:     National suicide hotlines: 97 599728, 6-537-503-TALK (5-579.919.1490) and 6-843-RAEIRIQ (9-758.499.5098). 2.  Call 911 or go to any nearest emergency room   3.    Access the [facility] Emergency Psychiatry Services:     - Go to the [facility] Psychiatric Emergency Services (PES) at [address] [facility], [address]   - Call the [address] at [phone].    - Call the [facility] Mobile Crisis Team at [phone]

## 2023-10-16 NOTE — PROGRESS NOTES
Barak Antunez (:  1970) is a 47 y.o. female,Established patient, here for evaluation of the following chief complaint(s): No chief complaint on file. Diagnosis:  1. Mild episode of recurrent major depressive disorder ([address])  2. ERIN (generalized anxiety disorder)  3. Insomnia due to medical condition    ASSESSMENT/PLAN:    Psychiatric   -  Continue Seroquel 25 mg/nightly. Can increase Seroquel  to  mg/nightly  - Continue Klonopin 0.5 mg daily PRN. Reports \" it has been helping a lot. \"   - Needs UDS at next visit.   -Patient also advised not mix OTC medications with klonopin and Seroquel. - Medication R/B/SE discussed and patient gave verbal consent for tx.  -Practice complementary health approaches such as: self-management strategies, relaxation techniques, yoga, and physical exercise as tolerated. 2. Safety  -NO Imminent risk of danger to self/others based on today's assessment. Patient is appropriate for outpatient level of care. Safety plan includes: 988, 911, PES, hotlines, and interventions discussed today. 3. Psychotherapy  - continue with Dr Elle Ledesma, PHD  4. Substance   - advised cutting back on cigarettes and caffeine intake. 5. Medical  - Follow with PCP. 6. RTC in 3 months or earlier if your symptoms fail to improve or go to nearest ER if having active SI/HI. Evaluated medications and assessed for side effects and effectiveness. Assessed patient's educational needs including reviewing plan of care, medications, and diagnosis. I reviewed the plan of care with the patient and the patient consented to the treatment interventions. Patient acknowledged, verbalized understanding, and agreed with plan of care       Interval Hx:  The patient is seen via VV for follow up. Patient reports she let her daughter-in-law go out of her house ( which was the \"biggest\" challenging for her for the last three years.) Reports Klonopin has been helping her a lot.  Sleep improved with Seroquel but

## 2023-10-18 ENCOUNTER — OFFICE VISIT (OUTPATIENT)
Dept: CARDIOLOGY CLINIC | Age: 53
End: 2023-10-18
Payer: MEDICAID

## 2023-10-18 VITALS
HEART RATE: 99 BPM | DIASTOLIC BLOOD PRESSURE: 80 MMHG | SYSTOLIC BLOOD PRESSURE: 128 MMHG | WEIGHT: 166.6 LBS | HEIGHT: 66 IN | OXYGEN SATURATION: 97 % | BODY MASS INDEX: 26.78 KG/M2

## 2023-10-18 DIAGNOSIS — R07.9 CHEST PAIN, UNSPECIFIED TYPE: Primary | ICD-10-CM

## 2023-10-18 PROCEDURE — G8484 FLU IMMUNIZE NO ADMIN: HCPCS | Performed by: INTERNAL MEDICINE

## 2023-10-18 PROCEDURE — 4004F PT TOBACCO SCREEN RCVD TLK: CPT | Performed by: INTERNAL MEDICINE

## 2023-10-18 PROCEDURE — G8427 DOCREV CUR MEDS BY ELIG CLIN: HCPCS | Performed by: INTERNAL MEDICINE

## 2023-10-18 PROCEDURE — 99204 OFFICE O/P NEW MOD 45 MIN: CPT | Performed by: INTERNAL MEDICINE

## 2023-10-18 PROCEDURE — 93000 ELECTROCARDIOGRAM COMPLETE: CPT | Performed by: INTERNAL MEDICINE

## 2023-10-18 PROCEDURE — 3079F DIAST BP 80-89 MM HG: CPT | Performed by: INTERNAL MEDICINE

## 2023-10-18 PROCEDURE — G8419 CALC BMI OUT NRM PARAM NOF/U: HCPCS | Performed by: INTERNAL MEDICINE

## 2023-10-18 PROCEDURE — 3017F COLORECTAL CA SCREEN DOC REV: CPT | Performed by: INTERNAL MEDICINE

## 2023-10-18 PROCEDURE — 3074F SYST BP LT 130 MM HG: CPT | Performed by: INTERNAL MEDICINE

## 2023-10-18 RX ORDER — ESTRADIOL 0.5 MG/1
TABLET ORAL
COMMUNITY

## 2023-10-18 NOTE — PROGRESS NOTES
hours. No results for input(s): \"BNP\" in the last 72 hours. No results for input(s): \"PROTIME\", \"INR\" in the last 72 hours. No results for input(s): \"APTT\" in the last 72 hours. No results for input(s): \"CKTOTAL\", \"CKMB\", \"CKMBINDEX\", \"TROPONINI\" in the last 72 hours. Lab Results   Component Value Date/Time    HDL 38 07/24/2023 10:53 AM    HDL 45 04/06/2010 09:00 AM    LDLCALC 87 07/24/2023 10:53 AM    TRIG 136 07/24/2023 10:53 AM     No results for input(s): \"AST\", \"ALT\", \"LABALBU\" in the last 72 hours. EKG:   Normal sinus rhythm with nonspecific ST abnormality    ECHO:2014  LVH: EF 55%      Corornary angiogram   Patient that she is that she has had a twice   Each time it was about 30% lesion      ASSESSMENT AND PLAN:      The patient is description of chest pain is atypical for angina  It is a squeezing pain that is brought on with stress and anxiety and associated with abdominal burning and then loss of bowel control  It sounds more like a panicky attack to me  Has also mentioned that she has no precipitating of the pain with physical activity. She has been taking statins with excellent LDL control    Likelihood of this being due to coronary ischemia is low  Do a plain stress test to rule out ischemia      PS: She drinks a ton of MyCoopZABETH but states that does not have any acid reflux  Would like her to try a PPI inhibitor on a future visit    Stress test is negative for ischemia then we can consider use of either blocker or nitrates in case this is coronary spasm or microvascular angina        Lizzie BRIONES  10/18/2023

## 2023-10-25 ENCOUNTER — HOSPITAL ENCOUNTER (EMERGENCY)
Age: 53
Discharge: HOME | End: 2023-10-26
Payer: MEDICAID

## 2023-10-25 ENCOUNTER — APPOINTMENT (OUTPATIENT)
Dept: GENERAL RADIOLOGY | Age: 53
End: 2023-10-25
Payer: MEDICAID

## 2023-10-25 VITALS
HEART RATE: 92 BPM | TEMPERATURE: 97.7 F | WEIGHT: 165.12 LBS | DIASTOLIC BLOOD PRESSURE: 72 MMHG | SYSTOLIC BLOOD PRESSURE: 107 MMHG | RESPIRATION RATE: 18 BRPM | HEIGHT: 66 IN | OXYGEN SATURATION: 96 % | BODY MASS INDEX: 26.54 KG/M2

## 2023-10-25 DIAGNOSIS — S30.0XXA CONTUSION OF COCCYX, INITIAL ENCOUNTER: Primary | ICD-10-CM

## 2023-10-25 PROCEDURE — 72220 X-RAY EXAM SACRUM TAILBONE: CPT

## 2023-10-25 PROCEDURE — 99283 EMERGENCY DEPT VISIT LOW MDM: CPT

## 2023-10-25 PROCEDURE — 6370000000 HC RX 637 (ALT 250 FOR IP): Performed by: PHYSICIAN ASSISTANT

## 2023-10-25 RX ORDER — OXYCODONE HYDROCHLORIDE AND ACETAMINOPHEN 5; 325 MG/1; MG/1
1 TABLET ORAL ONCE
Status: COMPLETED | OUTPATIENT
Start: 2023-10-25 | End: 2023-10-25

## 2023-10-25 RX ORDER — IBUPROFEN 400 MG/1
800 TABLET ORAL ONCE
Status: COMPLETED | OUTPATIENT
Start: 2023-10-25 | End: 2023-10-25

## 2023-10-25 RX ADMIN — IBUPROFEN 800 MG: 400 TABLET, FILM COATED ORAL at 23:33

## 2023-10-25 RX ADMIN — OXYCODONE AND ACETAMINOPHEN 1 TABLET: 5; 325 TABLET ORAL at 23:32

## 2023-10-25 ASSESSMENT — ENCOUNTER SYMPTOMS
SHORTNESS OF BREATH: 0
BACK PAIN: 0
ABDOMINAL PAIN: 0
EYE PAIN: 0
NAUSEA: 0
SORE THROAT: 0
VOMITING: 0
COUGH: 0

## 2023-10-25 ASSESSMENT — PAIN SCALES - GENERAL
PAINLEVEL_OUTOF10: 10
PAINLEVEL_OUTOF10: 10

## 2023-10-25 ASSESSMENT — PAIN DESCRIPTION - LOCATION: LOCATION: BUTTOCKS

## 2023-10-25 ASSESSMENT — PAIN DESCRIPTION - DESCRIPTORS: DESCRIPTORS: DULL;SORE

## 2023-10-25 ASSESSMENT — PAIN - FUNCTIONAL ASSESSMENT
PAIN_FUNCTIONAL_ASSESSMENT: PREVENTS OR INTERFERES SOME ACTIVE ACTIVITIES AND ADLS
PAIN_FUNCTIONAL_ASSESSMENT: 0-10

## 2023-10-26 RX ORDER — NAPROXEN 500 MG/1
500 TABLET ORAL 2 TIMES DAILY WITH MEALS
Qty: 20 TABLET | Refills: 0 | Status: SHIPPED | OUTPATIENT
Start: 2023-10-26

## 2023-10-26 NOTE — DISCHARGE INSTRUCTIONS
Continue home medication as prescribed only  Take prescribed medication as prescribed only  Use doughnut for sitting  Ice area 3-4 times a day 20 to 30 minutes on

## 2023-10-26 NOTE — ED TRIAGE NOTES
Pt into ED from home post fall today at 1400 while mopping. Pt c/o 10/10 tailbone pain. Pt denies striking head of loss of consciousness. Pt took percocet before arrival without relief. Pt states pain is preventing her from completing usual ADL's.      Pt a/ox4, VSS

## 2023-10-26 NOTE — ED PROVIDER NOTES
**ADVANCED PRACTICE PROVIDER, I HAVE EVALUATED THIS PATIENT**        [address] [facility] [address] [address]      Pt Name: Marbella Desouza  MNZ:[id number]  [address] [facility] 1970  Date of evaluation: 10/25/2023  Provider: Sally Fitzpatrick PA-C  Note Started: 11:11 PM EDT 10/25/23        Chief Complaint:    Chief Complaint   Patient presents with    Fall     Pt into ED from home post fall today at 1400 while mopping. Pt c/o 10/10 tailbone pain. Pt denies striking head of loss of consciousness. Pt took percocet before arrival without relief. Pt states pain is preventing her from completing usual ADL's. Nursing Notes, Past Medical Hx, Past Surgical Hx, Social Hx, Allergies, and Family Hx were all reviewed and agreed with or any disagreements were addressed in the HPI.    HPI: (Location, Duration, Timing, Severity, Quality, Assoc Sx, Context, Modifying factors)    History From: Patient  Limitations to history : None    Social Determinants Significantly Affecting Health : None    Chief Complaint of tailbone pain. Patient states she was mopping the floor at home and slipped and fell directly on her tailbone. This occurred about 2:00 PM.  She denies any numbness or tingling in her feet. No loss of bowel or urinary control. Denies abdominal pain. No upper back or neck pain. No loss of consciousness. Did not hit her head. She is ambulatory. No other complaints. This is a  47 y.o. female who presents to emergency room with the above complaint.     PastMedical/Surgical History:      Diagnosis Date    Back pain     CAD (coronary artery disease)     COPD (chronic obstructive pulmonary disease) (HCC)     GERD (gastroesophageal reflux disease)     Hypertension     stress related    Neck pain     PTSD (post-traumatic stress disorder)          Procedure Laterality Date    CHOLECYSTECTOMY      HEMORRHOID SURGERY  05/2015    HYSTERECTOMY (CERVIX STATUS UNKNOWN)      TUBAL LIGATION

## 2023-10-26 NOTE — ED NOTES
Discharge and education instructions reviewed. Patient verbalized understanding, teach-back successful. Patient denied questions at this time. No acute distress noted. Patient instructed to follow-up as noted - return to emergency department if symptoms worsen. Patient verbalized understanding. Discharged per EDMD with discharge instructions.         Itzel Cotton RN  10/26/23 9138 (0) Normal symmetrical movements

## 2023-10-29 ENCOUNTER — HOSPITAL ENCOUNTER (EMERGENCY)
Age: 53
Discharge: HOME | End: 2023-10-30
Payer: MEDICAID

## 2023-10-29 ENCOUNTER — APPOINTMENT (OUTPATIENT)
Dept: CT IMAGING | Age: 53
End: 2023-10-29
Payer: MEDICAID

## 2023-10-29 VITALS
OXYGEN SATURATION: 97 % | HEIGHT: 66 IN | SYSTOLIC BLOOD PRESSURE: 127 MMHG | WEIGHT: 162.04 LBS | RESPIRATION RATE: 16 BRPM | TEMPERATURE: 98 F | HEART RATE: 86 BPM | DIASTOLIC BLOOD PRESSURE: 84 MMHG | BODY MASS INDEX: 26.04 KG/M2

## 2023-10-29 DIAGNOSIS — S76.211A INGUINAL STRAIN, RIGHT, INITIAL ENCOUNTER: ICD-10-CM

## 2023-10-29 DIAGNOSIS — R10.31 RIGHT GROIN PAIN: Primary | ICD-10-CM

## 2023-10-29 LAB
BASOPHILS # BLD: 0.1 K/UL (ref 0–0.2)
BASOPHILS NFR BLD: 0.8 %
BILIRUB UR QL STRIP.AUTO: NEGATIVE
CLARITY UR: CLEAR
COLOR UR: YELLOW
DEPRECATED RDW RBC AUTO: 12 % (ref 12.4–15.4)
EOSINOPHIL # BLD: 0.3 K/UL (ref 0–0.6)
EOSINOPHIL NFR BLD: 2.6 %
GLUCOSE UR STRIP.AUTO-MCNC: NEGATIVE MG/DL
HCT VFR BLD AUTO: 42.2 % (ref 36–48)
HGB BLD-MCNC: 14.7 G/DL (ref 12–16)
HGB UR QL STRIP.AUTO: NEGATIVE
KETONES UR STRIP.AUTO-MCNC: NEGATIVE MG/DL
LEUKOCYTE ESTERASE UR QL STRIP.AUTO: NEGATIVE
LYMPHOCYTES # BLD: 3.7 K/UL (ref 1–5.1)
LYMPHOCYTES NFR BLD: 30.5 %
MCH RBC QN AUTO: 32.6 PG (ref 26–34)
MCHC RBC AUTO-ENTMCNC: 34.8 G/DL (ref 31–36)
MCV RBC AUTO: 93.6 FL (ref 80–100)
MONOCYTES # BLD: 0.6 K/UL (ref 0–1.3)
MONOCYTES NFR BLD: 5.2 %
NEUTROPHILS # BLD: 7.3 K/UL (ref 1.7–7.7)
NEUTROPHILS NFR BLD: 60.9 %
NITRITE UR QL STRIP.AUTO: NEGATIVE
PH UR STRIP.AUTO: 6 [PH] (ref 5–8)
PLATELET # BLD AUTO: 376 K/UL (ref 135–450)
PMV BLD AUTO: 7.9 FL (ref 5–10.5)
PROT UR STRIP.AUTO-MCNC: NEGATIVE MG/DL
RBC # BLD AUTO: 4.5 M/UL (ref 4–5.2)
SP GR UR STRIP.AUTO: 1.01 (ref 1–1.03)
UA COMPLETE W REFLEX CULTURE PNL UR: NORMAL
UA DIPSTICK W REFLEX MICRO PNL UR: NORMAL
URN SPEC COLLECT METH UR: NORMAL
UROBILINOGEN UR STRIP-ACNC: 1 E.U./DL
WBC # BLD AUTO: 12 K/UL (ref 4–11)

## 2023-10-29 PROCEDURE — 96374 THER/PROPH/DIAG INJ IV PUSH: CPT

## 2023-10-29 PROCEDURE — 85025 COMPLETE CBC W/AUTO DIFF WBC: CPT

## 2023-10-29 PROCEDURE — 83690 ASSAY OF LIPASE: CPT

## 2023-10-29 PROCEDURE — 99285 EMERGENCY DEPT VISIT HI MDM: CPT

## 2023-10-29 PROCEDURE — 81003 URINALYSIS AUTO W/O SCOPE: CPT

## 2023-10-29 PROCEDURE — 6360000002 HC RX W HCPCS: Performed by: NURSE PRACTITIONER

## 2023-10-29 PROCEDURE — 80053 COMPREHEN METABOLIC PANEL: CPT

## 2023-10-29 RX ORDER — KETOROLAC TROMETHAMINE 15 MG/ML
15 INJECTION, SOLUTION INTRAMUSCULAR; INTRAVENOUS ONCE
Status: COMPLETED | OUTPATIENT
Start: 2023-10-29 | End: 2023-10-29

## 2023-10-29 RX ADMIN — KETOROLAC TROMETHAMINE 15 MG: 15 INJECTION, SOLUTION INTRAMUSCULAR; INTRAVENOUS at 23:05

## 2023-10-29 ASSESSMENT — PAIN - FUNCTIONAL ASSESSMENT: PAIN_FUNCTIONAL_ASSESSMENT: 0-10

## 2023-10-29 ASSESSMENT — PAIN SCALES - GENERAL: PAINLEVEL_OUTOF10: 10

## 2023-10-29 ASSESSMENT — PAIN DESCRIPTION - ORIENTATION: ORIENTATION: RIGHT

## 2023-10-29 ASSESSMENT — PAIN DESCRIPTION - LOCATION: LOCATION: GROIN

## 2023-10-30 ENCOUNTER — APPOINTMENT (OUTPATIENT)
Dept: CT IMAGING | Age: 53
End: 2023-10-30
Payer: MEDICAID

## 2023-10-30 LAB
ALBUMIN SERPL-MCNC: 4.4 G/DL (ref 3.4–5)
ALBUMIN/GLOB SERPL: 1.6 {RATIO} (ref 1.1–2.2)
ALP SERPL-CCNC: 110 U/L (ref 40–129)
ALT SERPL-CCNC: 14 U/L (ref 10–40)
ANION GAP SERPL CALCULATED.3IONS-SCNC: 11 MMOL/L (ref 3–16)
AST SERPL-CCNC: 11 U/L (ref 15–37)
BILIRUB SERPL-MCNC: <0.2 MG/DL (ref 0–1)
BUN SERPL-MCNC: 12 MG/DL (ref 7–20)
CALCIUM SERPL-MCNC: 9.1 MG/DL (ref 8.3–10.6)
CHLORIDE SERPL-SCNC: 101 MMOL/L (ref 99–110)
CO2 SERPL-SCNC: 25 MMOL/L (ref 21–32)
CREAT SERPL-MCNC: 0.7 MG/DL (ref 0.6–1.1)
GFR SERPLBLD CREATININE-BSD FMLA CKD-EPI: >60 ML/MIN/{1.73_M2}
GLUCOSE SERPL-MCNC: 97 MG/DL (ref 70–99)
LIPASE SERPL-CCNC: 30 U/L (ref 13–60)
POTASSIUM SERPL-SCNC: 4.2 MMOL/L (ref 3.5–5.1)
PROT SERPL-MCNC: 7.2 G/DL (ref 6.4–8.2)
SODIUM SERPL-SCNC: 137 MMOL/L (ref 136–145)

## 2023-10-30 PROCEDURE — 74177 CT ABD & PELVIS W/CONTRAST: CPT

## 2023-10-30 PROCEDURE — 6360000004 HC RX CONTRAST MEDICATION: Performed by: NURSE PRACTITIONER

## 2023-10-30 RX ADMIN — IOPAMIDOL 75 ML: 755 INJECTION, SOLUTION INTRAVENOUS at 00:46

## 2023-10-30 NOTE — ED TRIAGE NOTES
Patient to ED for groin pain. Patient complaints of \"knot\" in her right groin, onset today. Pain is described as \"sharp\" and rates a \"10\". Patient had recent tailbone injury but denies any complaints. Patient changed into gown.

## 2023-10-30 NOTE — ED PROVIDER NOTES
[address] [place] [address],Building [facility] [address]  Dept: [phone]  Loc: [phone]  eMERGENCYdEPARTMENT eNCOUnter      Pt Name: Michael Carter  MRN: [id number]  [address] [facility] [place] 1970  Date of evaluation: 10/29/2023  Provider:AMMY Hall CNP    Independently seen and evaluated by the advanced practice provider  CHIEF COMPLAINT       Chief Complaint   Patient presents with    Groin Pain     Right groin, feels knot       CRITICAL CARE TIME       HISTORY OF PRESENT ILLNESS  (Location/Symptom, Timing/Onset, Context/Setting, Quality, Duration,Modifying Factors, Severity.)   Michael Carter is a 47 y.o. female who presents to the emergency department PMHx: Chronic back pain, PTSD, HTN, GERD, COPD, CAD, hysterectomy, tubal ligation, hemorrhoid surgery, cholecystectomy    Lives at home  Anticoagulation therapy none  CODE STATUS full  Employed as a EMT    HPI provided by the patient    Patient presents to the emergency department complaining of ongoing sacral and coccyx pain to the point that she cannot sit down. She rates her pain a 10 out of 10. She is also complaining of an acute onset of right groin pain when she was sitting on the couch last evening to the point that she cannot stand and bear weight on the right leg because of pain. It hurts to flex the leg up or straighten the leg out. She was seen on October 25 for a slip and fall landing on her buttocks resulting in coccyx and sacral pain. Electronic record review indicates that the patient was seen in the emergency department on October 25 for a slip and fall. She was noted to have a coccyx contusion, x-rays of the sacrum and coccyx were completed done and interpreted as being negative. Nursing Notes were reviewedand agreed with or any disagreements were addressed in the HPI.     REVIEW OF SYSTEMS    (2-9 systems for level 4, 10 or more for level 5)     Review of

## 2023-10-30 NOTE — ED NOTES
.Pt discharged at this time. Discharge instructions and medications reviewed,  Questions were answered. PT verbalized understanding. Follow up appointments were discussed.          Erminia Castleman, [place]  10/30/23 7160

## 2023-11-15 DIAGNOSIS — F41.1 GAD (GENERALIZED ANXIETY DISORDER): ICD-10-CM

## 2023-11-15 DIAGNOSIS — G47.01 INSOMNIA DUE TO MEDICAL CONDITION: ICD-10-CM

## 2023-11-15 RX ORDER — QUETIAPINE FUMARATE 50 MG/1
50 TABLET, FILM COATED ORAL NIGHTLY
Qty: 90 TABLET | Refills: 0 | Status: SHIPPED | OUTPATIENT
Start: 2023-11-15 | End: 2023-12-16 | Stop reason: SDUPTHER

## 2023-11-15 RX ORDER — CLONAZEPAM 0.5 MG/1
0.5 TABLET ORAL DAILY PRN
Qty: 30 TABLET | Refills: 0 | Status: SHIPPED | OUTPATIENT
Start: 2023-11-15 | End: 2023-12-16 | Stop reason: SDUPTHER

## 2023-11-15 NOTE — TELEPHONE ENCOUNTER
Medication:   Requested Prescriptions     Pending Prescriptions Disp Refills    QUEtiapine (SEROQUEL) 25 MG tablet 90 tablet 0     Sig: Take 1 tablet by mouth daily    clonazePAM (KLONOPIN) 0.5 MG tablet 30 tablet 0     Sig: Take 1 tablet by mouth daily as needed for Anxiety for up to 30 days. TAKE ONE TABLET OF KLONOPIN 0.5 MG DAILY AS NEEDED FOR ANXIETY Max Daily Amount: 0.5 mg        Last Filled:      Patient Phone Number: [phone] (home)     Last appt: 10/16/2023   Next appt: 11/27/2023    Last OARRS:        No data to display

## 2024-01-18 ENCOUNTER — OFFICE VISIT (OUTPATIENT)
Dept: PSYCHIATRY | Age: 54
End: 2024-01-18
Payer: MEDICAID

## 2024-01-18 DIAGNOSIS — F41.0 PANIC DISORDER: ICD-10-CM

## 2024-01-18 DIAGNOSIS — F33.1 MODERATE EPISODE OF RECURRENT MAJOR DEPRESSIVE DISORDER (HCC): ICD-10-CM

## 2024-01-18 DIAGNOSIS — F41.1 GAD (GENERALIZED ANXIETY DISORDER): Primary | ICD-10-CM

## 2024-01-18 DIAGNOSIS — G47.01 INSOMNIA DUE TO MEDICAL CONDITION: ICD-10-CM

## 2024-01-18 PROCEDURE — 4004F PT TOBACCO SCREEN RCVD TLK: CPT | Performed by: REGISTERED NURSE

## 2024-01-18 PROCEDURE — 3017F COLORECTAL CA SCREEN DOC REV: CPT | Performed by: REGISTERED NURSE

## 2024-01-18 PROCEDURE — G8484 FLU IMMUNIZE NO ADMIN: HCPCS | Performed by: REGISTERED NURSE

## 2024-01-18 PROCEDURE — G8428 CUR MEDS NOT DOCUMENT: HCPCS | Performed by: REGISTERED NURSE

## 2024-01-18 PROCEDURE — G8419 CALC BMI OUT NRM PARAM NOF/U: HCPCS | Performed by: REGISTERED NURSE

## 2024-01-18 PROCEDURE — 99213 OFFICE O/P EST LOW 20 MIN: CPT | Performed by: REGISTERED NURSE

## 2024-01-18 RX ORDER — CLONAZEPAM 0.5 MG/1
0.5 TABLET ORAL DAILY PRN
Qty: 45 TABLET | Refills: 0 | Status: SHIPPED | OUTPATIENT
Start: 2024-01-18 | End: 2024-03-03

## 2024-01-18 RX ORDER — QUETIAPINE FUMARATE 50 MG/1
50 TABLET, FILM COATED ORAL NIGHTLY
Qty: 90 TABLET | Refills: 0 | Status: SHIPPED | OUTPATIENT
Start: 2024-01-18 | End: 2024-04-17

## 2024-01-18 NOTE — PROGRESS NOTES
PSYCHIATRY OUT PATIENT FOLLOW UP    Patient name: Jory Peng  : 1970  Date of service: 24  PCP: Melissa Vallecillo MD    Dx:  1. ERIN (generalized anxiety disorder)  -     clonazePAM (KLONOPIN) 0.5 MG tablet; Take 1 tablet by mouth daily as needed for Anxiety for up to 45 days. TAKE ONE TABLET OF KLONOPIN 0.5 MG DAILY AS NEEDED FOR ANXIETY Max Daily Amount: 0.5 mg, Disp-45 tablet, R-0Normal  -     Drug Panel-PM-HI Res-UR Interp-A; Future  2. Moderate episode of recurrent major depressive disorder (HCC)  3. Insomnia due to medical condition  -     clonazePAM (KLONOPIN) 0.5 MG tablet; Take 1 tablet by mouth daily as needed for Anxiety for up to 45 days. TAKE ONE TABLET OF KLONOPIN 0.5 MG DAILY AS NEEDED FOR ANXIETY Max Daily Amount: 0.5 mg, Disp-45 tablet, R-0Normal  4. Panic disorder  -     Drug Panel-PM-HI Res-UR Interp-A; Future    Assessment and plan    Psychiatric   - Continue Seroquel  mg/nightly   - continue Klonopin 0.5 mg daily PRN. Sent a refill for 45 days.   - UDS ordered today.   -Medication R/B/SE discussed and patient gave verbal consent for tx.  -Practice complementary health approaches such as: self-management strategies, relaxation techniques, yoga, and physical exercise as tolerated.     2. Safety  -NO Imminent risk of danger to self/others based on today's assessment. Patient is appropriate for outpatient level of care.  Safety plan includes: 988, 911, PES, hotlines, and interventions discussed today.   3.  Psychotherapy  -continue with Dr Liu, PHD  4.  Substance   - advised cutting back on cigarettes and caffeine intake.  5. Medical   - Follow with PCP  6. RTC in 3 months or earlier if your symptoms fail to improve or go to nearest ER if having active SI/HI.   Evaluated medications and assessed for side effects and effectiveness. Assessed patient's educational needs including reviewing plan of care, medications,diagnosis, treatment options, and prognosis. I reviewed

## 2024-01-18 NOTE — PATIENT INSTRUCTIONS
Here are some of Psychiatric Emergency resources for you:     National suicide hotlines: 988, 5-937-857-TALK (1-775.990.8687) and 1-378-YTAJUMK (1-367.307.5461).   2.  Call 911 or go to any nearest emergency room   3.   Access the [facility] Emergency Psychiatry Services:     - Go to the  Psychiatric Emergency Services (PES) at [address] [address]   - Call the  PES at [phone].    - Call the  Mobile Crisis Team at [phone]     Anti-depressant drugs: This class of drugs can cause sedation, blurred vision, dry-mouth, constipation, postural hypotension, urinary retention, tachycardia, muscle tremors, agitation, headache, skin rash, photo sensitivity, excessive weight gain, glaucoma, heart disease, lowering seizure threshold, increase risk of suicidal thinking or ideations, excessive sweating, sextual dysfunction, insomnia, anxiety, bruxism, GI bleeding, pregnancy complications and birth defects, possible death, etc.      Anti-anxiety drugs: Sedation, morning hangover, ataxia, nausea, respiratory depression, decrease cognitive function, light-headiness, withdrawal effects, anterograde amnesia, possible death, etc.                  Insomnia : General Principles     You should sleep in only one location which is your bedroom. Do not watch TV, use computer or read in your bedroom. You want to associate your bedroom with sleep only. You should do these things in a different room     Your sleep environment should be dark with no light exposure     When you wake in the morning you should exposure yourself to bright light     Get into bed at the same time every night     Get up out of bed no later than 8 AM     No naps during daytime     Do not watch the clock     No caffeine after 2 PM      Try to exercise in the late afternoon or early evening as many days a week as possible

## 2024-03-04 DIAGNOSIS — F41.1 GAD (GENERALIZED ANXIETY DISORDER): ICD-10-CM

## 2024-03-04 DIAGNOSIS — G47.01 INSOMNIA DUE TO MEDICAL CONDITION: ICD-10-CM

## 2024-03-04 NOTE — TELEPHONE ENCOUNTER
Medication:   Requested Prescriptions     Pending Prescriptions Disp Refills    clonazePAM (KLONOPIN) 0.5 MG tablet 45 tablet 0     Sig: Take 1 tablet by mouth daily as needed for Anxiety for up to 45 days. TAKE ONE TABLET OF KLONOPIN 0.5 MG DAILY AS NEEDED FOR ANXIETY Max Daily Amount: 0.5 mg    QUEtiapine (SEROQUEL) 50 MG tablet 90 tablet 0     Sig: Take 1 tablet by mouth nightly TAKE ONE TABLET OF SEROQUEL 50 MG NIGHTLY        Last Filled:      Patient Phone Number: [phone] (home)     Last appt: 1/18/2024   Next appt: 4/18/2024    Last OARRS:        No data to display

## 2024-03-05 RX ORDER — CLONAZEPAM 0.5 MG/1
0.5 TABLET ORAL DAILY PRN
Qty: 45 TABLET | Refills: 0 | Status: SHIPPED | OUTPATIENT
Start: 2024-03-05 | End: 2024-04-19

## 2024-03-05 RX ORDER — QUETIAPINE FUMARATE 50 MG/1
50 TABLET, FILM COATED ORAL NIGHTLY
Qty: 90 TABLET | Refills: 0 | Status: SHIPPED | OUTPATIENT
Start: 2024-03-05 | End: 2024-06-03

## 2024-04-15 DIAGNOSIS — F41.0 PANIC DISORDER: ICD-10-CM

## 2024-04-15 DIAGNOSIS — F41.1 GAD (GENERALIZED ANXIETY DISORDER): ICD-10-CM

## 2024-04-18 ENCOUNTER — OFFICE VISIT (OUTPATIENT)
Dept: PSYCHIATRY | Age: 54
End: 2024-04-18
Payer: MEDICAID

## 2024-04-18 DIAGNOSIS — F33.0 MILD EPISODE OF RECURRENT MAJOR DEPRESSIVE DISORDER (HCC): Primary | ICD-10-CM

## 2024-04-18 DIAGNOSIS — F41.1 GAD (GENERALIZED ANXIETY DISORDER): ICD-10-CM

## 2024-04-18 DIAGNOSIS — G47.01 INSOMNIA DUE TO MEDICAL CONDITION: ICD-10-CM

## 2024-04-18 LAB
6MAM UR QL: NOT DETECTED
7AMINOCLONAZEPAM UR QL: PRESENT
A-OH ALPRAZ UR QL: NOT DETECTED
ALPHA-OH-MIDAZOLAM, URINE: NOT DETECTED
ALPRAZ UR QL: NOT DETECTED
AMPHET UR QL SCN: NOT DETECTED
ANNOTATION COMMENT IMP: NORMAL
ANNOTATION COMMENT IMP: NORMAL
BARBITURATES UR QL: NORMAL
BUPRENORPHINE UR QL: NOT DETECTED
BZE UR QL: NEGATIVE
CARBOXYTHC UR QL: NORMAL
CARISOPRODOL UR QL: NEGATIVE
CLONAZEPAM UR QL: NOT DETECTED
CODEINE UR QL: NOT DETECTED
CREAT UR-MCNC: 230 MG/DL (ref 20–400)
DIAZEPAM UR QL: NOT DETECTED
ETHYL GLUCURONIDE UR QL: NEGATIVE
FENTANYL UR QL: NOT DETECTED
GABAPENTIN: PRESENT
HYDROCODONE UR QL: NOT DETECTED
HYDROMORPHONE UR QL: NOT DETECTED
LORAZEPAM UR QL: NOT DETECTED
MDA UR QL: NOT DETECTED
MDEA UR QL: NOT DETECTED
MDMA UR QL: NOT DETECTED
MEPERIDINE UR QL: NOT DETECTED
METHADONE UR QL: NEGATIVE
METHAMPHET UR QL: NOT DETECTED
MIDAZOLAM UR QL SCN: NOT DETECTED
MORPHINE UR QL: NOT DETECTED
NALOXONE: NOT DETECTED
NORBUPRENORPHINE UR QL CFM: NOT DETECTED
NORDIAZEPAM UR QL: NOT DETECTED
NORFENTANYL UR QL: NOT DETECTED
NORHYDROCODONE UR QL CFM: NOT DETECTED
NOROXYCODONE UR QL CFM: PRESENT
NOROXYMORPHONE, URINE: PRESENT
OXAZEPAM UR QL: NOT DETECTED
OXYCODONE UR QL: PRESENT
OXYMORPHONE UR QL: PRESENT
PATHOLOGY STUDY: NORMAL
PCP UR QL: NEGATIVE
PHENTERMINE UR QL: NOT DETECTED
PPAA UR QL: NOT DETECTED
PREGABALIN: NOT DETECTED
SERVICE CMNT-IMP: NORMAL
TAPENTADOL UR QL SCN: NOT DETECTED
TAPENTADOL-O-SULFATE, URINE: NOT DETECTED
TEMAZEPAM UR QL: NOT DETECTED
TRAMADOL UR QL: NEGATIVE
ZOLPIDEM UR QL: NOT DETECTED

## 2024-04-18 PROCEDURE — 4004F PT TOBACCO SCREEN RCVD TLK: CPT | Performed by: REGISTERED NURSE

## 2024-04-18 PROCEDURE — 3017F COLORECTAL CA SCREEN DOC REV: CPT | Performed by: REGISTERED NURSE

## 2024-04-18 PROCEDURE — G8419 CALC BMI OUT NRM PARAM NOF/U: HCPCS | Performed by: REGISTERED NURSE

## 2024-04-18 PROCEDURE — 99213 OFFICE O/P EST LOW 20 MIN: CPT | Performed by: REGISTERED NURSE

## 2024-04-18 PROCEDURE — G8428 CUR MEDS NOT DOCUMENT: HCPCS | Performed by: REGISTERED NURSE

## 2024-04-18 RX ORDER — CLONAZEPAM 0.5 MG/1
0.5 TABLET ORAL DAILY PRN
Qty: 60 TABLET | Refills: 0 | Status: SHIPPED | OUTPATIENT
Start: 2024-04-23 | End: 2024-06-22

## 2024-04-18 ASSESSMENT — PATIENT HEALTH QUESTIONNAIRE - PHQ9
5. POOR APPETITE OR OVEREATING: NOT AT ALL
6. FEELING BAD ABOUT YOURSELF - OR THAT YOU ARE A FAILURE OR HAVE LET YOURSELF OR YOUR FAMILY DOWN: NOT AT ALL
2. FEELING DOWN, DEPRESSED OR HOPELESS: NOT AT ALL
SUM OF ALL RESPONSES TO PHQ QUESTIONS 1-9: 4
7. TROUBLE CONCENTRATING ON THINGS, SUCH AS READING THE NEWSPAPER OR WATCHING TELEVISION: SEVERAL DAYS
SUM OF ALL RESPONSES TO PHQ QUESTIONS 1-9: 4
1. LITTLE INTEREST OR PLEASURE IN DOING THINGS: NOT AT ALL
4. FEELING TIRED OR HAVING LITTLE ENERGY: SEVERAL DAYS
SUM OF ALL RESPONSES TO PHQ9 QUESTIONS 1 & 2: 0
SUM OF ALL RESPONSES TO PHQ QUESTIONS 1-9: 4
9. THOUGHTS THAT YOU WOULD BE BETTER OFF DEAD, OR OF HURTING YOURSELF: NOT AT ALL
8. MOVING OR SPEAKING SO SLOWLY THAT OTHER PEOPLE COULD HAVE NOTICED. OR THE OPPOSITE, BEING SO FIGETY OR RESTLESS THAT YOU HAVE BEEN MOVING AROUND A LOT MORE THAN USUAL: NOT AT ALL
3. TROUBLE FALLING OR STAYING ASLEEP: MORE THAN HALF THE DAYS
SUM OF ALL RESPONSES TO PHQ QUESTIONS 1-9: 4

## 2024-04-18 ASSESSMENT — ANXIETY QUESTIONNAIRES
2. NOT BEING ABLE TO STOP OR CONTROL WORRYING: MORE THAN HALF THE DAYS
4. TROUBLE RELAXING: MORE THAN HALF THE DAYS
6. BECOMING EASILY ANNOYED OR IRRITABLE: MORE THAN HALF THE DAYS
7. FEELING AFRAID AS IF SOMETHING AWFUL MIGHT HAPPEN: NOT AT ALL
1. FEELING NERVOUS, ANXIOUS, OR ON EDGE: NEARLY EVERY DAY
3. WORRYING TOO MUCH ABOUT DIFFERENT THINGS: MORE THAN HALF THE DAYS
GAD7 TOTAL SCORE: 12
5. BEING SO RESTLESS THAT IT IS HARD TO SIT STILL: SEVERAL DAYS

## 2024-04-18 NOTE — PATIENT INSTRUCTIONS
Here are some of Psychiatric Emergency resources for you:     National suicide hotlines: 988, 5-798-674-TALK (1-930.516.2011) and 7-090-VEGQUKQ (1-521.801.4856).   2.  Call 911 or go to any nearest emergency room   3.   Access the [facility] Emergency Psychiatry Services:     - Go to the  Psychiatric Emergency Services (PES) at [address] [address]   - Call the  PES at [phone].    - Call the  Mobile Crisis Team at [phone]     Anti-depressant drugs: This class of drugs can cause sedation, blurred vision, dry-mouth, constipation, postural hypotension, urinary retention, tachycardia, muscle tremors, agitation, headache, skin rash, photo sensitivity, excessive weight gain, glaucoma, heart disease, lowering seizure threshold, increase risk of suicidal thinking or ideations, excessive sweating, sextual dysfunction, insomnia, anxiety, bruxism, GI bleeding, pregnancy complications and birth defects, possible death, etc.      Anti-anxiety drugs: Sedation, morning hangover, ataxia, nausea, respiratory depression, decrease cognitive function, light-headiness, withdrawal effects, anterograde amnesia, possible death, etc.

## 2024-04-18 NOTE — PROGRESS NOTES
PSYCHIATRY OUT PATIENT FOLLOW UP    Patient name: Jory Peng  : 1970  Date of service: 24  PCP: Melissa Vallecillo MD    Dx:  1. Mild episode of recurrent major depressive disorder (HCC)  2. ERIN (generalized anxiety disorder)  -     clonazePAM (KLONOPIN) 0.5 MG tablet; Take 1 tablet by mouth daily as needed for Anxiety for up to 60 days. TAKE ONE TABLET OF KLONOPIN 0.5 MG DAILY AS NEEDED FOR ANXIETY Max Daily Amount: 0.5 mg, Disp-60 tablet, R-0Normal  3. Insomnia due to medical condition  -     clonazePAM (KLONOPIN) 0.5 MG tablet; Take 1 tablet by mouth daily as needed for Anxiety for up to 60 days. TAKE ONE TABLET OF KLONOPIN 0.5 MG DAILY AS NEEDED FOR ANXIETY Max Daily Amount: 0.5 mg, Disp-60 tablet, R-0Normal    Assessment and plan    Psychiatric   - Continue Seroquel  mg/nightly   - continue Klonopin 0.5 mg daily PRN. Next refill will sent for 60 days  - UDS result pending    -Medication R/B/SE discussed and patient gave verbal consent for tx.  -Practice complementary health approaches such as: self-management strategies, relaxation techniques, yoga, and physical exercise as tolerated.     2. Safety  -NO Imminent risk of danger to self/others based on today's assessment. Patient is appropriate for outpatient level of care.  Safety plan includes: 988, 911, PES, hotlines, and interventions discussed today.   3.  Psychotherapy  -continue with Dr Liu, PHD  4.  Substance   - advised cutting back on cigarettes and caffeine intake.  5. Medical   - Follow with PCP  6. RTC in 3 months or earlier if your symptoms fail to improve or go to nearest ER if having active SI/HI.   Evaluated medications and assessed for side effects and effectiveness. Assessed patient's educational needs including reviewing plan of care, medications,diagnosis, treatment options, and prognosis. I reviewed the plan of care with the patient and the patient consented to the treatment interventions. Patient

## 2024-06-29 DIAGNOSIS — F41.1 GAD (GENERALIZED ANXIETY DISORDER): ICD-10-CM

## 2024-06-29 DIAGNOSIS — G47.01 INSOMNIA DUE TO MEDICAL CONDITION: ICD-10-CM

## 2024-07-01 RX ORDER — QUETIAPINE FUMARATE 50 MG/1
50 TABLET, FILM COATED ORAL NIGHTLY
Qty: 90 TABLET | Refills: 0 | Status: SHIPPED | OUTPATIENT
Start: 2024-07-01 | End: 2024-09-29

## 2024-07-01 RX ORDER — CLONAZEPAM 0.5 MG/1
0.5 TABLET ORAL DAILY PRN
Qty: 60 TABLET | Refills: 0 | Status: SHIPPED | OUTPATIENT
Start: 2024-07-01 | End: 2024-08-30

## 2024-07-01 NOTE — TELEPHONE ENCOUNTER
Medication:   Requested Prescriptions     Pending Prescriptions Disp Refills    QUEtiapine (SEROQUEL) 50 MG tablet 90 tablet 0     Sig: Take 1 tablet by mouth nightly TAKE ONE TABLET OF SEROQUEL 50 MG NIGHTLY    clonazePAM (KLONOPIN) 0.5 MG tablet 60 tablet 0     Sig: Take 1 tablet by mouth daily as needed for Anxiety for up to 60 days. TAKE ONE TABLET OF KLONOPIN 0.5 MG DAILY AS NEEDED FOR ANXIETY Max Daily Amount: 0.5 mg        Last Filled:      Patient Phone Number: [phone] (home)     Last appt: 4/18/2024   Next appt: 7/18/2024    Last OARRS:        No data to display

## 2024-07-19 ENCOUNTER — COMMUNITY OUTREACH (OUTPATIENT)
Dept: INTERNAL MEDICINE CLINIC | Age: 54
End: 2024-07-19

## 2024-07-19 NOTE — PROGRESS NOTES
Patient's HM shows they are overdue for Mammogram and Colorectal Screening.   Care Everywhere and  files searched.   updated with 2015 mammogram.

## 2024-07-25 ENCOUNTER — OFFICE VISIT (OUTPATIENT)
Dept: PSYCHIATRY | Age: 54
End: 2024-07-25
Payer: MEDICAID

## 2024-07-25 DIAGNOSIS — G47.00 PHYSIOLOGICAL INSOMNIA: ICD-10-CM

## 2024-07-25 DIAGNOSIS — F41.1 GAD (GENERALIZED ANXIETY DISORDER): Primary | ICD-10-CM

## 2024-07-25 PROCEDURE — G8419 CALC BMI OUT NRM PARAM NOF/U: HCPCS | Performed by: REGISTERED NURSE

## 2024-07-25 PROCEDURE — G8428 CUR MEDS NOT DOCUMENT: HCPCS | Performed by: REGISTERED NURSE

## 2024-07-25 PROCEDURE — 4004F PT TOBACCO SCREEN RCVD TLK: CPT | Performed by: REGISTERED NURSE

## 2024-07-25 PROCEDURE — 99213 OFFICE O/P EST LOW 20 MIN: CPT | Performed by: REGISTERED NURSE

## 2024-07-25 PROCEDURE — 3017F COLORECTAL CA SCREEN DOC REV: CPT | Performed by: REGISTERED NURSE

## 2024-07-25 RX ORDER — QUETIAPINE FUMARATE 25 MG/1
25 TABLET, FILM COATED ORAL NIGHTLY
Qty: 90 TABLET | Refills: 0 | Status: SHIPPED | OUTPATIENT
Start: 2024-07-25 | End: 2024-10-23

## 2024-07-25 NOTE — PATIENT INSTRUCTIONS
Here are some of Psychiatric Emergency resources for you:     National suicide hotlines: 988, 6-747-302-TALK (1-376.822.6251) and 8-649-IVFFOTV (1-730.155.5538).   2.  Call 911 or go to any nearest emergency room   3.   Access the [facility] Emergency Psychiatry Services:     - Go to the  Psychiatric Emergency Services (PES) at [address] [address]   - Call the  PES at [phone].    - Call the  Mobile Crisis Team at [phone]     Anti-anxiety drugs: Sedation, morning hangover, ataxia, nausea, respiratory depression, decrease cognitive function, light-headiness, withdrawal effects, anterograde amnesia, possible death, etc.

## 2024-07-25 NOTE — PROGRESS NOTES
PSYCHIATRY OUT PATIENT FOLLOW UP    Patient name: Jory Peng  : 1970  Date of service: 24  PCP: Melissa Vallecillo MD    Dx:  1. ERIN (generalized anxiety disorder)  2. Physiological insomnia    Assessment and plan    Psychiatric   - Decrease Seroquel 50 mg/nightly > Seroquel 25 mg nightly   - continue Klonopin 0.5 mg daily PRN. Next refill will sent for 90 days. - Denies misuse or abuse.   - Noted patient taking Oxycodone and Gabapentin for pain.   - Discussed holistic approaches and coping skills to MH symptoms management.   -Medication R/B/SE discussed and patient gave verbal consent for tx.  -Practice complementary health approaches such as: self-management strategies, relaxation techniques, yoga, and physical exercise as tolerated.   2. Safety  -NO Imminent risk of danger to self/others based on today's assessment. Patient is appropriate for outpatient level of care.  Safety plan includes: 988, 911, PES, hotlines, and interventions discussed today.   3.  Psychotherapy  - defer at this time.   -Relaxation techniques  4.  Substance   -advised cutting back on cigarettes smoking and caffeine intake.   5. Medical   - Follow with PCP  6. RTC in 3 months or earlier if your symptoms fail to improve or go to nearest ER if having active SI/HI.   Evaluated medications and assessed for side effects and effectiveness. Assessed patient's educational needs including reviewing plan of care, medications,diagnosis, treatment options, and prognosis. I reviewed the plan of care with the patient and the patient consented to the treatment interventions. Patient acknowledged, verbalized understanding, and agreed with plan of care.    Interval progress:   2024 Here with her granddaughter ( Marcelina -4 yrs old). Reports she quitted her job due to the difficult work environment. States they wanted her to work 19 hours without any break. She has started working at [employer]. Works 3 PM - 3 AM.  Struggles with

## 2024-08-30 DIAGNOSIS — F41.1 GAD (GENERALIZED ANXIETY DISORDER): ICD-10-CM

## 2024-08-30 DIAGNOSIS — G47.01 INSOMNIA DUE TO MEDICAL CONDITION: ICD-10-CM

## 2024-08-30 RX ORDER — CLONAZEPAM 0.5 MG/1
0.5 TABLET ORAL DAILY PRN
Qty: 60 TABLET | Refills: 0 | Status: SHIPPED | OUTPATIENT
Start: 2024-08-30 | End: 2024-10-29

## 2024-08-30 RX ORDER — CLONAZEPAM 0.5 MG/1
0.5 TABLET ORAL DAILY PRN
Qty: 60 TABLET | Refills: 0 | Status: CANCELLED | OUTPATIENT
Start: 2024-08-30 | End: 2024-10-29

## 2024-08-30 NOTE — TELEPHONE ENCOUNTER
Medication:   Requested Prescriptions     Pending Prescriptions Disp Refills    clonazePAM (KLONOPIN) 0.5 MG tablet 60 tablet 0     Sig: Take 1 tablet by mouth daily as needed for Anxiety for up to 60 days. TAKE ONE TABLET OF KLONOPIN 0.5 MG DAILY AS NEEDED FOR ANXIETY Max Daily Amount: 0.5 mg        Last Filled:      Patient Phone Number: [phone] (home)     Last appt: 7/25/2024   Next appt: 10/24/2024    Last OARRS:        No data to display

## 2024-09-13 ENCOUNTER — TELEPHONE (OUTPATIENT)
Dept: INTERNAL MEDICINE CLINIC | Age: 54
End: 2024-09-13

## 2024-10-24 ENCOUNTER — OFFICE VISIT (OUTPATIENT)
Dept: PSYCHIATRY | Age: 54
End: 2024-10-24
Payer: COMMERCIAL

## 2024-10-24 VITALS
HEART RATE: 81 BPM | OXYGEN SATURATION: 99 % | SYSTOLIC BLOOD PRESSURE: 149 MMHG | WEIGHT: 166.8 LBS | BODY MASS INDEX: 26.81 KG/M2 | DIASTOLIC BLOOD PRESSURE: 84 MMHG | HEIGHT: 66 IN

## 2024-10-24 DIAGNOSIS — F41.1 GAD (GENERALIZED ANXIETY DISORDER): ICD-10-CM

## 2024-10-24 DIAGNOSIS — G47.00 PHYSIOLOGICAL INSOMNIA: Primary | ICD-10-CM

## 2024-10-24 DIAGNOSIS — G47.01 INSOMNIA DUE TO MEDICAL CONDITION: ICD-10-CM

## 2024-10-24 PROCEDURE — 3077F SYST BP >= 140 MM HG: CPT | Performed by: REGISTERED NURSE

## 2024-10-24 PROCEDURE — 3079F DIAST BP 80-89 MM HG: CPT | Performed by: REGISTERED NURSE

## 2024-10-24 PROCEDURE — 99213 OFFICE O/P EST LOW 20 MIN: CPT | Performed by: REGISTERED NURSE

## 2024-10-24 PROCEDURE — G8484 FLU IMMUNIZE NO ADMIN: HCPCS | Performed by: REGISTERED NURSE

## 2024-10-24 PROCEDURE — 4004F PT TOBACCO SCREEN RCVD TLK: CPT | Performed by: REGISTERED NURSE

## 2024-10-24 PROCEDURE — G8427 DOCREV CUR MEDS BY ELIG CLIN: HCPCS | Performed by: REGISTERED NURSE

## 2024-10-24 PROCEDURE — G8419 CALC BMI OUT NRM PARAM NOF/U: HCPCS | Performed by: REGISTERED NURSE

## 2024-10-24 PROCEDURE — 3017F COLORECTAL CA SCREEN DOC REV: CPT | Performed by: REGISTERED NURSE

## 2024-10-24 RX ORDER — CLONAZEPAM 0.5 MG/1
0.5 TABLET ORAL DAILY PRN
Qty: 90 TABLET | Refills: 0 | Status: SHIPPED | OUTPATIENT
Start: 2024-10-29 | End: 2025-01-27

## 2024-10-24 RX ORDER — QUETIAPINE FUMARATE 25 MG/1
25 TABLET, FILM COATED ORAL NIGHTLY
Qty: 90 TABLET | Refills: 0 | Status: SHIPPED | OUTPATIENT
Start: 2024-10-24 | End: 2025-01-22

## 2024-10-24 ASSESSMENT — PATIENT HEALTH QUESTIONNAIRE - PHQ9
SUM OF ALL RESPONSES TO PHQ QUESTIONS 1-9: 5
5. POOR APPETITE OR OVEREATING: NOT AT ALL
SUM OF ALL RESPONSES TO PHQ QUESTIONS 1-9: 5
SUM OF ALL RESPONSES TO PHQ QUESTIONS 1-9: 5
9. THOUGHTS THAT YOU WOULD BE BETTER OFF DEAD, OR OF HURTING YOURSELF: NOT AT ALL
2. FEELING DOWN, DEPRESSED OR HOPELESS: NOT AT ALL
1. LITTLE INTEREST OR PLEASURE IN DOING THINGS: NOT AT ALL
4. FEELING TIRED OR HAVING LITTLE ENERGY: MORE THAN HALF THE DAYS
SUM OF ALL RESPONSES TO PHQ9 QUESTIONS 1 & 2: 0
8. MOVING OR SPEAKING SO SLOWLY THAT OTHER PEOPLE COULD HAVE NOTICED. OR THE OPPOSITE, BEING SO FIGETY OR RESTLESS THAT YOU HAVE BEEN MOVING AROUND A LOT MORE THAN USUAL: NOT AT ALL
SUM OF ALL RESPONSES TO PHQ QUESTIONS 1-9: 5
7. TROUBLE CONCENTRATING ON THINGS, SUCH AS READING THE NEWSPAPER OR WATCHING TELEVISION: SEVERAL DAYS
3. TROUBLE FALLING OR STAYING ASLEEP: MORE THAN HALF THE DAYS
6. FEELING BAD ABOUT YOURSELF - OR THAT YOU ARE A FAILURE OR HAVE LET YOURSELF OR YOUR FAMILY DOWN: NOT AT ALL

## 2024-10-24 ASSESSMENT — ANXIETY QUESTIONNAIRES
5. BEING SO RESTLESS THAT IT IS HARD TO SIT STILL: SEVERAL DAYS
7. FEELING AFRAID AS IF SOMETHING AWFUL MIGHT HAPPEN: NOT AT ALL
2. NOT BEING ABLE TO STOP OR CONTROL WORRYING: MORE THAN HALF THE DAYS
3. WORRYING TOO MUCH ABOUT DIFFERENT THINGS: MORE THAN HALF THE DAYS
1. FEELING NERVOUS, ANXIOUS, OR ON EDGE: NEARLY EVERY DAY
6. BECOMING EASILY ANNOYED OR IRRITABLE: NEARLY EVERY DAY
GAD7 TOTAL SCORE: 13
4. TROUBLE RELAXING: MORE THAN HALF THE DAYS

## 2024-10-24 NOTE — PROGRESS NOTES
oxyCODONE-acetaminophen (PERCOCET) 7.5-325 MG per tablet Take 1 tablet by mouth every 8 hours as needed for Pain.      gabapentin (NEURONTIN) 300 MG capsule Take 1 capsule by mouth nightly.       No current facility-administered medications for this visit.       O:  Wt Readings from Last 3 Encounters:   10/24/24 75.7 kg (166 lb 12.8 oz)   10/29/23 73.5 kg (162 lb 0.6 oz)   10/25/23 74.9 kg (165 lb 2 oz)     Temp Readings from Last 3 Encounters:   10/29/23 98 °F (36.7 °C) (Oral)   10/25/23 97.7 °F (36.5 °C) (Oral)   07/24/23 98.7 °F (37.1 °C) (Temporal)     BP Readings from Last 3 Encounters:   10/24/24 (!) 149/84   10/29/23 127/84   10/25/23 107/72     Pulse Readings from Last 3 Encounters:   10/24/24 81   10/29/23 86   10/25/23 92     Lab Results   Component Value Date/Time    LABA1C 5.4 07/24/2023 10:53 AM     Cholesterol, Total   Date Value   07/24/2023 152 mg/dL   04/06/2010 205 mg/dl (H)      No components found for: \"TSHREFLEX\"  Lab Results   Component Value Date/Time    TSH 0.94 07/24/2023 10:53 AM    TSH 0.50 07/15/2013 01:25 PM    TSH 0.93 04/06/2010 09:00 AM           10/24/2024    12:53 PM 7/25/2024     4:02 PM 4/18/2024    10:51 AM 9/14/2023     1:17 PM   ERIN 7 SCORE   ERIN-7 Total Score 13 16 12 18     Interpretation of ERIN-7 score: 5-9 = mild anxiety, 10-14 = moderate anxiety,   15+ = severe anxiety. Recommend referral to behavioral health for scores 10 or greater.        10/24/2024    12:53 PM 7/25/2024     4:01 PM 4/18/2024    10:51 AM 10/25/2023    10:48 PM 9/14/2023     1:17 PM 7/24/2023    10:25 AM   PHQ Scores   PHQ2 Score 0 0 0 0 1 0   PHQ9 Score 5 6 4 0 9 0       Interpretation of PHQ-9 score:  1-4 = minimal depression, 5-9 = mild depression,   10-14 = moderate depression; 15-19 = moderately severe depression, 20-27 = severe depression    Aims: 0  Labs: Up to date    Mental Status Exam:   Appearance    alert, cooperative  Motor: Normal strength and tone, No abnormal movements, tics or

## 2024-11-02 ENCOUNTER — OFFICE VISIT (OUTPATIENT)
Age: 54
End: 2024-11-02

## 2024-11-02 VITALS
BODY MASS INDEX: 26.47 KG/M2 | TEMPERATURE: 97.7 F | WEIGHT: 164 LBS | DIASTOLIC BLOOD PRESSURE: 78 MMHG | HEART RATE: 80 BPM | OXYGEN SATURATION: 96 % | SYSTOLIC BLOOD PRESSURE: 130 MMHG

## 2024-11-02 DIAGNOSIS — J03.90 TONSILLITIS: Primary | ICD-10-CM

## 2024-11-02 DIAGNOSIS — J02.9 SORE THROAT: ICD-10-CM

## 2024-11-02 LAB — S PYO AG THROAT QL: NORMAL

## 2024-11-02 RX ORDER — CEFDINIR 300 MG/1
300 CAPSULE ORAL 2 TIMES DAILY
Qty: 20 CAPSULE | Refills: 0 | Status: SHIPPED | OUTPATIENT
Start: 2024-11-02 | End: 2024-11-12

## 2024-11-02 NOTE — PATIENT INSTRUCTIONS
Discharge medications reviewed with the patient and appropriate educational materials and side effects teaching were provided.    Increase fluids (preferably with electrolytes) and rest.  Emergency follow up required for symptoms including, but not limited to, shortness of breath, chest pain, mental status change, fevers >101, difficulty or inability to swallow, dehydration, or if symptoms worsen.  See printed instructions given at discharge.   Complete the full course of antibiotics . Eat a yogurt daily while on antibiotics to prevent yeast infection.

## 2024-11-02 NOTE — PROGRESS NOTES
Jory Peng (:  1970) is a 54 y.o. female,New patient, here for evaluation of the following chief complaint(s):  Pharyngitis      Assessment & Plan :  Visit Diagnoses and Associated Orders       Tonsillitis    -  Primary    cefdinir (OMNICEF) 300 MG capsule [43445]           Sore throat        POCT rapid strep A [68875 Custom]                  Complete the full course of antibiotics . Eat a yogurt daily while on antibiotics to prevent yeast infection.   Subjective :  Throat and tonsils are red and swollen.      History provided by:  Patient    HPI:   54 y.o. female presents with symptoms of strep tonsillitis    Vitals:    24 1603   BP: 130/78   Site: Left Upper Arm   Position: Sitting   Cuff Size: Medium Adult   Pulse: 80   Temp: 97.7 °F (36.5 °C)   TempSrc: Oral   SpO2: 96%   Weight: 74.4 kg (164 lb)          Objective   Physical Exam  Constitutional:       General: She is not in acute distress.     Appearance: Normal appearance.   HENT:      Right Ear: External ear normal.      Left Ear: External ear normal.      Nose: Congestion present.      Mouth/Throat:      Lips: Pink.      Mouth: Mucous membranes are moist.      Pharynx: Uvula midline. Pharyngeal swelling and posterior oropharyngeal erythema present. No oropharyngeal exudate or uvula swelling.      Tonsils: Tonsillar exudate present. 2+ on the right. 2+ on the left.   Eyes:      General: Lids are normal.   Cardiovascular:      Rate and Rhythm: Normal rate.   Pulmonary:      Effort: Pulmonary effort is normal.   Skin:     General: Skin is warm and dry.   Psychiatric:         Behavior: Behavior is cooperative.              An electronic signature was used to authenticate this note.    --Beverly Quinn, AMMY - CNP

## 2025-01-27 ENCOUNTER — OFFICE VISIT (OUTPATIENT)
Dept: PSYCHIATRY | Age: 55
End: 2025-01-27
Payer: COMMERCIAL

## 2025-01-27 DIAGNOSIS — G47.00 PHYSIOLOGICAL INSOMNIA: Primary | ICD-10-CM

## 2025-01-27 DIAGNOSIS — F41.1 GAD (GENERALIZED ANXIETY DISORDER): ICD-10-CM

## 2025-01-27 PROCEDURE — G8419 CALC BMI OUT NRM PARAM NOF/U: HCPCS | Performed by: REGISTERED NURSE

## 2025-01-27 PROCEDURE — 99214 OFFICE O/P EST MOD 30 MIN: CPT | Performed by: REGISTERED NURSE

## 2025-01-27 PROCEDURE — 3017F COLORECTAL CA SCREEN DOC REV: CPT | Performed by: REGISTERED NURSE

## 2025-01-27 PROCEDURE — 4004F PT TOBACCO SCREEN RCVD TLK: CPT | Performed by: REGISTERED NURSE

## 2025-01-27 PROCEDURE — G8428 CUR MEDS NOT DOCUMENT: HCPCS | Performed by: REGISTERED NURSE

## 2025-01-27 RX ORDER — CLONAZEPAM 0.5 MG/1
0.5 TABLET ORAL DAILY PRN
Qty: 90 TABLET | Refills: 0 | Status: SHIPPED | OUTPATIENT
Start: 2025-01-31 | End: 2025-05-01

## 2025-01-27 RX ORDER — QUETIAPINE FUMARATE 25 MG/1
25 TABLET, FILM COATED ORAL NIGHTLY
Qty: 90 TABLET | Refills: 0 | Status: SHIPPED | OUTPATIENT
Start: 2025-01-27 | End: 2025-04-27

## 2025-01-27 ASSESSMENT — PATIENT HEALTH QUESTIONNAIRE - PHQ9
6. FEELING BAD ABOUT YOURSELF - OR THAT YOU ARE A FAILURE OR HAVE LET YOURSELF OR YOUR FAMILY DOWN: NOT AT ALL
SUM OF ALL RESPONSES TO PHQ QUESTIONS 1-9: 4
5. POOR APPETITE OR OVEREATING: NOT AT ALL
7. TROUBLE CONCENTRATING ON THINGS, SUCH AS READING THE NEWSPAPER OR WATCHING TELEVISION: SEVERAL DAYS
SUM OF ALL RESPONSES TO PHQ9 QUESTIONS 1 & 2: 0
2. FEELING DOWN, DEPRESSED OR HOPELESS: NOT AT ALL
1. LITTLE INTEREST OR PLEASURE IN DOING THINGS: NOT AT ALL
8. MOVING OR SPEAKING SO SLOWLY THAT OTHER PEOPLE COULD HAVE NOTICED. OR THE OPPOSITE, BEING SO FIGETY OR RESTLESS THAT YOU HAVE BEEN MOVING AROUND A LOT MORE THAN USUAL: NOT AT ALL
SUM OF ALL RESPONSES TO PHQ QUESTIONS 1-9: 4
4. FEELING TIRED OR HAVING LITTLE ENERGY: MORE THAN HALF THE DAYS
9. THOUGHTS THAT YOU WOULD BE BETTER OFF DEAD, OR OF HURTING YOURSELF: NOT AT ALL
SUM OF ALL RESPONSES TO PHQ QUESTIONS 1-9: 4
SUM OF ALL RESPONSES TO PHQ QUESTIONS 1-9: 4
3. TROUBLE FALLING OR STAYING ASLEEP: SEVERAL DAYS

## 2025-01-27 ASSESSMENT — ANXIETY QUESTIONNAIRES
3. WORRYING TOO MUCH ABOUT DIFFERENT THINGS: NEARLY EVERY DAY
2. NOT BEING ABLE TO STOP OR CONTROL WORRYING: NEARLY EVERY DAY
1. FEELING NERVOUS, ANXIOUS, OR ON EDGE: NEARLY EVERY DAY
4. TROUBLE RELAXING: NEARLY EVERY DAY
GAD7 TOTAL SCORE: 15
5. BEING SO RESTLESS THAT IT IS HARD TO SIT STILL: SEVERAL DAYS
7. FEELING AFRAID AS IF SOMETHING AWFUL MIGHT HAPPEN: NOT AT ALL
6. BECOMING EASILY ANNOYED OR IRRITABLE: MORE THAN HALF THE DAYS

## 2025-01-27 NOTE — PROGRESS NOTES
PSYCHIATRY OUT PATIENT FOLLOW UP    Patient name: Jory Peng  : 1970  Date of service: 25  PCP: Melissa Vallecillo MD    Dx:  1. Physiological insomnia  -     clonazePAM (KLONOPIN) 0.5 MG tablet; Take 1 tablet by mouth daily as needed for Anxiety for up to 90 days. TAKE ONE TABLET OF KLONOPIN 0.5 MG DAILY AS NEEDED FOR ANXIETY/SLEEP Max Daily Amount: 0.5 mg, Disp-90 tablet, R-0Normal  2. ERIN (generalized anxiety disorder)  -     clonazePAM (KLONOPIN) 0.5 MG tablet; Take 1 tablet by mouth daily as needed for Anxiety for up to 90 days. TAKE ONE TABLET OF KLONOPIN 0.5 MG DAILY AS NEEDED FOR ANXIETY/SLEEP Max Daily Amount: 0.5 mg, Disp-90 tablet, R-0Normal    Assessment and plan    Psychiatric   - continue Seroquel 25 mg nightly ( Has not been taking it regularly)   - continue Klonopin 0.5 mg daily PRN. Sent 3 months supply. Denies misuse or abuse  - Try OTC Melatonin 3 mg nightly PRN     - UDS done in 2024.   - Noted patient taking Oxycodone and Gabapentin for pain ( rx'd by pain management provider)   - Discussed holistic approaches and coping skills to  symptoms management.   -Medication R/B/SE discussed and patient gave verbal consent for tx.  -Practice complementary health approaches such as: self-management strategies, relaxation techniques, yoga, and physical exercise as tolerated.   2. Safety  -NO Imminent risk of danger to self/others based on today's assessment. Patient is appropriate for outpatient level of care.  Safety plan includes: 988, 911, PES, hotlines, and interventions discussed today.   3.  Psychotherapy  - defer at this time.   -Relaxation techniques and coping skills for anxiety management   4.  Substance   -advised cutting back on cigarettes smoking and caffeine intake.   5. Medical   - Follow with PCP  6. RTC in 3 months or earlier if your symptoms fail to improve or go to nearest ER if having active SI/HI.   Evaluated medications and assessed for side effects and

## 2025-04-28 ENCOUNTER — CLINICAL DOCUMENTATION (OUTPATIENT)
Dept: PSYCHIATRY | Age: 55
End: 2025-04-28

## 2025-04-28 NOTE — PROGRESS NOTES
Patient had [facility] for f/u appointment on 4/28/2025 with [facility]. Patient needs to make f/u appointment. This is first occurrence.

## 2025-04-29 ENCOUNTER — OFFICE VISIT (OUTPATIENT)
Dept: INTERNAL MEDICINE CLINIC | Age: 55
End: 2025-04-29
Payer: COMMERCIAL

## 2025-04-29 VITALS
OXYGEN SATURATION: 98 % | WEIGHT: 162.2 LBS | DIASTOLIC BLOOD PRESSURE: 67 MMHG | BODY MASS INDEX: 26.18 KG/M2 | HEART RATE: 96 BPM | TEMPERATURE: 97 F | SYSTOLIC BLOOD PRESSURE: 116 MMHG

## 2025-04-29 DIAGNOSIS — R51.9 RIGHT SIDED FACIAL PAIN: Primary | ICD-10-CM

## 2025-04-29 DIAGNOSIS — E83.42 HYPOMAGNESEMIA: ICD-10-CM

## 2025-04-29 DIAGNOSIS — I25.119 CORONARY ARTERY DISEASE INVOLVING NATIVE CORONARY ARTERY OF NATIVE HEART WITH ANGINA PECTORIS: ICD-10-CM

## 2025-04-29 DIAGNOSIS — H57.11 EYE PAIN, RIGHT: ICD-10-CM

## 2025-04-29 DIAGNOSIS — I10 ESSENTIAL HYPERTENSION: ICD-10-CM

## 2025-04-29 PROCEDURE — 3074F SYST BP LT 130 MM HG: CPT | Performed by: INTERNAL MEDICINE

## 2025-04-29 PROCEDURE — 3078F DIAST BP <80 MM HG: CPT | Performed by: INTERNAL MEDICINE

## 2025-04-29 PROCEDURE — 99215 OFFICE O/P EST HI 40 MIN: CPT | Performed by: INTERNAL MEDICINE

## 2025-04-29 PROCEDURE — G2211 COMPLEX E/M VISIT ADD ON: HCPCS | Performed by: INTERNAL MEDICINE

## 2025-04-29 RX ORDER — ATORVASTATIN CALCIUM 40 MG/1
40 TABLET, FILM COATED ORAL DAILY
Qty: 90 TABLET | Refills: 1 | Status: SHIPPED | OUTPATIENT
Start: 2025-04-29

## 2025-04-29 RX ORDER — BUTALBITAL AND ACETAMINOPHEN 325; 50 MG/1; MG/1
1 TABLET ORAL EVERY 12 HOURS PRN
COMMUNITY
Start: 2025-04-03

## 2025-04-29 RX ORDER — SPIRONOLACTONE 25 MG/1
25 TABLET ORAL DAILY
Qty: 90 TABLET | Refills: 1 | Status: SHIPPED | OUTPATIENT
Start: 2025-04-29

## 2025-04-29 RX ORDER — ONDANSETRON 4 MG/1
4 TABLET, FILM COATED ORAL EVERY 8 HOURS PRN
COMMUNITY
Start: 2025-04-03

## 2025-04-29 SDOH — ECONOMIC STABILITY: FOOD INSECURITY: WITHIN THE PAST 12 MONTHS, YOU WORRIED THAT YOUR FOOD WOULD RUN OUT BEFORE YOU GOT MONEY TO BUY MORE.: NEVER TRUE

## 2025-04-29 SDOH — ECONOMIC STABILITY: FOOD INSECURITY: WITHIN THE PAST 12 MONTHS, THE FOOD YOU BOUGHT JUST DIDN'T LAST AND YOU DIDN'T HAVE MONEY TO GET MORE.: NEVER TRUE

## 2025-04-29 ASSESSMENT — ENCOUNTER SYMPTOMS
SHORTNESS OF BREATH: 0
COUGH: 0
VOMITING: 0
EYE ITCHING: 0
EYE PAIN: 1
FOREIGN BODY SENSATION: 0
BLURRED VISION: 0
ABDOMINAL PAIN: 0
DOUBLE VISION: 0
EYE REDNESS: 0
NAUSEA: 0
EYE DISCHARGE: 0
SORE THROAT: 0
BLOOD IN STOOL: 0
PHOTOPHOBIA: 0

## 2025-04-29 NOTE — PROGRESS NOTES
Jory Peng (:  1970) is a 55 y.o. female, Established patient, here for evaluation of the following chief complaint(s):  Eye Pain (Pain goes down face and down to top of gumline from temple.)      Assessment & Plan   ASSESSMENT/PLAN:  1. Right sided facial pain  Chronic, uncontrolled pain  Patient complains of intermittent right-sided facial and right eye pain that happens when she is stressed at work.  Patient states that pain improves after patient is relaxed.  She denies any jaw claudication, visual disturbance, jaw pain, dizziness, weakness tingling or numbness.  No significant abnormality noted on facial exam today.  Etiology of the symptoms is unclear, probably stress related.  Patient is requesting to get CT angiogram of head to rule out any possible aneurysms.  Ordered CTA head and neck for further evaluation.  Ordered ESR, CBC, CMP for evaluation, will treat based on results.  Advised patient to follow-up with ENT and neurology for further evaluation, referrals provided.  -     Comprehensive Metabolic Panel; Future  -     CBC with Auto Differential; Future  -     Sedimentation Rate; Future  -     CTA HEAD NECK W WO CONTRAST; Future  -     Jay Tucker DO, Otolaryngology, [facility]  -     Yaritza Vargas MD, Neurology, [facility]  2. Eye pain, right  Chronic, uncontrolled pain  Patient complains of intermittent right-sided facial and right eye pain that happens when she is stressed at work.  Patient states that pain improves after patient is relaxed.  She denies any jaw claudication, visual disturbance, jaw pain, dizziness, weakness tingling or numbness.  Patient has seen ophthalmology for the right eye pain in the past and no abnormality was noted.  No significant abnormality noted on facial exam today.  Etiology of the symptoms is unclear, probably stress related.  Patient is requesting to get CT angiogram of head to rule out any possible aneurysms.  Ordered CTA head

## 2025-04-30 ENCOUNTER — TELEPHONE (OUTPATIENT)
Dept: ENT CLINIC | Age: 55
End: 2025-04-30

## 2025-04-30 ENCOUNTER — RESULTS FOLLOW-UP (OUTPATIENT)
Dept: INTERNAL MEDICINE CLINIC | Age: 55
End: 2025-04-30

## 2025-04-30 DIAGNOSIS — R51.9 RIGHT SIDED FACIAL PAIN: Primary | ICD-10-CM

## 2025-04-30 NOTE — TELEPHONE ENCOUNTER
Patient called after receiving referral for right sided facial pain. Patient wants to know if we do see this issue? Please advise, thanks!

## 2025-05-02 NOTE — TELEPHONE ENCOUNTER
Please call patient and check with her about the symptoms.  Patient has to see vascular surgery as soon as possible for further evaluation of her facial pain, referral already placed in epic.